# Patient Record
Sex: MALE | Race: WHITE | NOT HISPANIC OR LATINO | Employment: FULL TIME | URBAN - METROPOLITAN AREA
[De-identification: names, ages, dates, MRNs, and addresses within clinical notes are randomized per-mention and may not be internally consistent; named-entity substitution may affect disease eponyms.]

---

## 2024-02-02 ENCOUNTER — HOSPITAL ENCOUNTER (INPATIENT)
Facility: HOSPITAL | Age: 69
LOS: 6 days | Discharge: NURSING, REHAB OR LONG-TERM CARE | DRG: 025 | End: 2024-02-09
Attending: EMERGENCY MEDICINE | Admitting: SURGERY
Payer: MEDICARE

## 2024-02-02 DIAGNOSIS — T14.90XA TRAUMA: ICD-10-CM

## 2024-02-02 DIAGNOSIS — R79.89 ELEVATED TROPONIN: ICD-10-CM

## 2024-02-02 DIAGNOSIS — S06.5XAA SUBDURAL HEMATOMA: ICD-10-CM

## 2024-02-02 DIAGNOSIS — G44.83 PRIMARY COUGH HEADACHE: ICD-10-CM

## 2024-02-02 DIAGNOSIS — S06.5X9A TRAUMATIC SUBDURAL HEMATOMA WITH LOSS OF CONSCIOUSNESS, INITIAL ENCOUNTER: Primary | ICD-10-CM

## 2024-02-02 PROCEDURE — 99291 CRITICAL CARE FIRST HOUR: CPT

## 2024-02-02 PROCEDURE — 94002 VENT MGMT INPAT INIT DAY: CPT

## 2024-02-02 PROCEDURE — 96365 THER/PROPH/DIAG IV INF INIT: CPT

## 2024-02-02 PROCEDURE — 99900035 HC TECH TIME PER 15 MIN (STAT)

## 2024-02-02 PROCEDURE — 27200966 HC CLOSED SUCTION SYSTEM

## 2024-02-02 PROCEDURE — 5A1935Z RESPIRATORY VENTILATION, LESS THAN 24 CONSECUTIVE HOURS: ICD-10-PCS | Performed by: SURGERY

## 2024-02-02 RX ORDER — FENTANYL CITRATE-0.9 % NACL/PF 10 MCG/ML
0-250 PLASTIC BAG, INJECTION (ML) INTRAVENOUS CONTINUOUS
Status: DISCONTINUED | OUTPATIENT
Start: 2024-02-03 | End: 2024-02-06

## 2024-02-02 RX ADMIN — PROPOFOL 40 MCG/KG/MIN: 10 INJECTION, EMULSION INTRAVENOUS at 11:02

## 2024-02-03 ENCOUNTER — ANESTHESIA EVENT (OUTPATIENT)
Dept: SURGERY | Facility: HOSPITAL | Age: 69
DRG: 025 | End: 2024-02-03
Payer: MEDICARE

## 2024-02-03 ENCOUNTER — ANESTHESIA (OUTPATIENT)
Dept: SURGERY | Facility: HOSPITAL | Age: 69
DRG: 025 | End: 2024-02-03
Payer: MEDICARE

## 2024-02-03 PROBLEM — S06.5XAA SUBDURAL HEMATOMA: Status: ACTIVE | Noted: 2024-02-03

## 2024-02-03 LAB
ABORH RETYPE: NORMAL
ALBUMIN SERPL-MCNC: 3.5 G/DL (ref 3.4–4.8)
ALBUMIN SERPL-MCNC: 3.7 G/DL (ref 3.4–4.8)
ALBUMIN/GLOB SERPL: 1.5 RATIO (ref 1.1–2)
ALBUMIN/GLOB SERPL: 1.6 RATIO (ref 1.1–2)
ALP SERPL-CCNC: 49 UNIT/L (ref 40–150)
ALP SERPL-CCNC: 51 UNIT/L (ref 40–150)
ALT SERPL-CCNC: 35 UNIT/L (ref 0–55)
ALT SERPL-CCNC: 39 UNIT/L (ref 0–55)
APPEARANCE UR: CLEAR
APTT PPP: 25.7 SECONDS (ref 23.2–33.7)
AST SERPL-CCNC: 29 UNIT/L (ref 5–34)
AST SERPL-CCNC: 35 UNIT/L (ref 5–34)
BACTERIA #/AREA URNS AUTO: ABNORMAL /HPF
BASE EXCESS BLD CALC-SCNC: -0.3 MMOL/L (ref -2–2)
BASOPHILS # BLD AUTO: 0.05 X10(3)/MCL
BASOPHILS # BLD AUTO: 0.07 X10(3)/MCL
BASOPHILS NFR BLD AUTO: 0.4 %
BASOPHILS NFR BLD AUTO: 0.4 %
BILIRUB SERPL-MCNC: 1.4 MG/DL
BILIRUB SERPL-MCNC: 1.6 MG/DL
BILIRUB UR QL STRIP.AUTO: NEGATIVE
BLOOD GAS SAMPLE TYPE (OHS): ABNORMAL
BUN SERPL-MCNC: 14.1 MG/DL (ref 8.4–25.7)
BUN SERPL-MCNC: 15.9 MG/DL (ref 8.4–25.7)
CA-I BLD-SCNC: 1.05 MMOL/L (ref 1.12–1.23)
CALCIUM SERPL-MCNC: 8.1 MG/DL (ref 8.8–10)
CALCIUM SERPL-MCNC: 8.6 MG/DL (ref 8.8–10)
CHLORIDE SERPL-SCNC: 107 MMOL/L (ref 98–107)
CHLORIDE SERPL-SCNC: 109 MMOL/L (ref 98–107)
CO2 BLDA-SCNC: 25.3 MMOL/L
CO2 SERPL-SCNC: 22 MMOL/L (ref 23–31)
CO2 SERPL-SCNC: 23 MMOL/L (ref 23–31)
COHGB MFR BLDA: 1.9 % (ref 0.5–1.5)
COLOR UR AUTO: ABNORMAL
CREAT SERPL-MCNC: 0.74 MG/DL (ref 0.73–1.18)
CREAT SERPL-MCNC: 0.84 MG/DL (ref 0.73–1.18)
DRAWN BY BLOOD GAS (OHS): ABNORMAL
EOSINOPHIL # BLD AUTO: 0.04 X10(3)/MCL (ref 0–0.9)
EOSINOPHIL # BLD AUTO: 0.05 X10(3)/MCL (ref 0–0.9)
EOSINOPHIL NFR BLD AUTO: 0.3 %
EOSINOPHIL NFR BLD AUTO: 0.4 %
ERYTHROCYTE [DISTWIDTH] IN BLOOD BY AUTOMATED COUNT: 14.2 % (ref 11.5–17)
ERYTHROCYTE [DISTWIDTH] IN BLOOD BY AUTOMATED COUNT: 14.5 % (ref 11.5–17)
GFR SERPLBLD CREATININE-BSD FMLA CKD-EPI: >60 MLS/MIN/1.73/M2
GFR SERPLBLD CREATININE-BSD FMLA CKD-EPI: >60 MLS/MIN/1.73/M2
GLOBULIN SER-MCNC: 2.2 GM/DL (ref 2.4–3.5)
GLOBULIN SER-MCNC: 2.4 GM/DL (ref 2.4–3.5)
GLUCOSE SERPL-MCNC: 112 MG/DL (ref 82–115)
GLUCOSE SERPL-MCNC: 140 MG/DL (ref 82–115)
GLUCOSE UR QL STRIP.AUTO: NORMAL
GROUP & RH: NORMAL
HCO3 BLDA-SCNC: 24.1 MMOL/L (ref 22–26)
HCT VFR BLD AUTO: 40.2 % (ref 42–52)
HCT VFR BLD AUTO: 44.3 % (ref 42–52)
HGB BLD-MCNC: 13.9 G/DL (ref 14–18)
HGB BLD-MCNC: 14.8 G/DL (ref 14–18)
IMM GRANULOCYTES # BLD AUTO: 0.06 X10(3)/MCL (ref 0–0.04)
IMM GRANULOCYTES # BLD AUTO: 0.08 X10(3)/MCL (ref 0–0.04)
IMM GRANULOCYTES NFR BLD AUTO: 0.4 %
IMM GRANULOCYTES NFR BLD AUTO: 0.7 %
INDIRECT COOMBS: NORMAL
INHALED O2 CONCENTRATION: 30 %
INR PPP: 1
INR PPP: 1.1
KETONES UR QL STRIP.AUTO: NEGATIVE
LACTATE SERPL-SCNC: 2 MMOL/L (ref 0.5–2.2)
LEUKOCYTE ESTERASE UR QL STRIP.AUTO: 250
LYMPHOCYTES # BLD AUTO: 0.49 X10(3)/MCL (ref 0.6–4.6)
LYMPHOCYTES # BLD AUTO: 1.2 X10(3)/MCL (ref 0.6–4.6)
LYMPHOCYTES NFR BLD AUTO: 4.4 %
LYMPHOCYTES NFR BLD AUTO: 7.5 %
MCH RBC QN AUTO: 31.5 PG (ref 27–31)
MCH RBC QN AUTO: 31.7 PG (ref 27–31)
MCHC RBC AUTO-ENTMCNC: 33.4 G/DL (ref 33–36)
MCHC RBC AUTO-ENTMCNC: 34.6 G/DL (ref 33–36)
MCV RBC AUTO: 91.8 FL (ref 80–94)
MCV RBC AUTO: 94.3 FL (ref 80–94)
MECH RR (OHS): 24 B/MIN
METHGB MFR BLDA: 0.9 % (ref 0.4–1.5)
MODE (OHS): AC
MONOCYTES # BLD AUTO: 0.64 X10(3)/MCL (ref 0.1–1.3)
MONOCYTES # BLD AUTO: 1.51 X10(3)/MCL (ref 0.1–1.3)
MONOCYTES NFR BLD AUTO: 5.7 %
MONOCYTES NFR BLD AUTO: 9.4 %
NEUTROPHILS # BLD AUTO: 13.1 X10(3)/MCL (ref 2.1–9.2)
NEUTROPHILS # BLD AUTO: 9.86 X10(3)/MCL (ref 2.1–9.2)
NEUTROPHILS NFR BLD AUTO: 82 %
NEUTROPHILS NFR BLD AUTO: 88.4 %
NITRITE UR QL STRIP.AUTO: NEGATIVE
NRBC BLD AUTO-RTO: 0 %
NRBC BLD AUTO-RTO: 0 %
O2 HB BLOOD GAS (OHS): 94.4 % (ref 94–97)
OXYGEN DEVICE BLOOD GAS (OHS): ABNORMAL
OXYHGB MFR BLDA: 13.6 G/DL (ref 12–16)
PCO2 BLDA: 38 MMHG (ref 35–45)
PEEP RESPIRATORY: 8 CMH2O
PH BLDA: 7.41 [PH] (ref 7.35–7.45)
PH UR STRIP.AUTO: 6.5 [PH]
PLATELET # BLD AUTO: 124 X10(3)/MCL (ref 130–400)
PLATELET # BLD AUTO: 169 X10(3)/MCL (ref 130–400)
PLATELETS.RETICULATED NFR BLD AUTO: 5.5 % (ref 0.9–11.2)
PMV BLD AUTO: 11.4 FL (ref 7.4–10.4)
PMV BLD AUTO: 11.8 FL (ref 7.4–10.4)
PO2 BLDA: 75 MMHG (ref 80–100)
POTASSIUM BLOOD GAS (OHS): 3.7 MMOL/L (ref 3.5–5)
POTASSIUM SERPL-SCNC: 4.2 MMOL/L (ref 3.5–5.1)
POTASSIUM SERPL-SCNC: 4.5 MMOL/L (ref 3.5–5.1)
PROT SERPL-MCNC: 5.7 GM/DL (ref 5.8–7.6)
PROT SERPL-MCNC: 6.1 GM/DL (ref 5.8–7.6)
PROT UR QL STRIP.AUTO: NEGATIVE
PROTHROMBIN TIME: 13.4 SECONDS (ref 12.5–14.5)
PROTHROMBIN TIME: 14.4 SECONDS (ref 12.5–14.5)
RBC # BLD AUTO: 4.38 X10(6)/MCL (ref 4.7–6.1)
RBC # BLD AUTO: 4.7 X10(6)/MCL (ref 4.7–6.1)
RBC #/AREA URNS AUTO: ABNORMAL /HPF
RBC UR QL AUTO: ABNORMAL
SAMPLE SITE BLOOD GAS (OHS): ABNORMAL
SAO2 % BLDA: 95 %
SODIUM BLOOD GAS (OHS): 132 MMOL/L (ref 137–145)
SODIUM SERPL-SCNC: 139 MMOL/L (ref 136–145)
SODIUM SERPL-SCNC: 140 MMOL/L (ref 136–145)
SP GR UR STRIP.AUTO: 1.02 (ref 1–1.03)
SPECIMEN OUTDATE: NORMAL
SPONT+MECH VT ON VENT: 500 ML
SQUAMOUS #/AREA URNS LPF: ABNORMAL /HPF
TROPONIN I SERPL-MCNC: 0.34 NG/ML (ref 0–0.04)
TROPONIN I SERPL-MCNC: 0.55 NG/ML (ref 0–0.04)
UROBILINOGEN UR STRIP-ACNC: NORMAL
WBC # SPEC AUTO: 11.16 X10(3)/MCL (ref 4.5–11.5)
WBC # SPEC AUTO: 15.99 X10(3)/MCL (ref 4.5–11.5)
WBC #/AREA URNS AUTO: ABNORMAL /HPF

## 2024-02-03 PROCEDURE — 85025 COMPLETE CBC W/AUTO DIFF WBC: CPT | Performed by: STUDENT IN AN ORGANIZED HEALTH CARE EDUCATION/TRAINING PROGRAM

## 2024-02-03 PROCEDURE — 25000003 PHARM REV CODE 250

## 2024-02-03 PROCEDURE — 25000003 PHARM REV CODE 250: Performed by: EMERGENCY MEDICINE

## 2024-02-03 PROCEDURE — 99223 1ST HOSP IP/OBS HIGH 75: CPT | Mod: 57,ICN,, | Performed by: STUDENT IN AN ORGANIZED HEALTH CARE EDUCATION/TRAINING PROGRAM

## 2024-02-03 PROCEDURE — 96366 THER/PROPH/DIAG IV INF ADDON: CPT

## 2024-02-03 PROCEDURE — 93010 ELECTROCARDIOGRAM REPORT: CPT | Mod: ,,, | Performed by: INTERNAL MEDICINE

## 2024-02-03 PROCEDURE — 25000003 PHARM REV CODE 250: Performed by: SURGERY

## 2024-02-03 PROCEDURE — 80053 COMPREHEN METABOLIC PANEL: CPT

## 2024-02-03 PROCEDURE — 93005 ELECTROCARDIOGRAM TRACING: CPT

## 2024-02-03 PROCEDURE — 99900035 HC TECH TIME PER 15 MIN (STAT)

## 2024-02-03 PROCEDURE — 63600175 PHARM REV CODE 636 W HCPCS: Performed by: EMERGENCY MEDICINE

## 2024-02-03 PROCEDURE — D9220A PRA ANESTHESIA: Mod: CRNA,,,

## 2024-02-03 PROCEDURE — 37000008 HC ANESTHESIA 1ST 15 MINUTES: Performed by: STUDENT IN AN ORGANIZED HEALTH CARE EDUCATION/TRAINING PROGRAM

## 2024-02-03 PROCEDURE — 63600175 PHARM REV CODE 636 W HCPCS: Performed by: STUDENT IN AN ORGANIZED HEALTH CARE EDUCATION/TRAINING PROGRAM

## 2024-02-03 PROCEDURE — 27201423 OPTIME MED/SURG SUP & DEVICES STERILE SUPPLY: Performed by: STUDENT IN AN ORGANIZED HEALTH CARE EDUCATION/TRAINING PROGRAM

## 2024-02-03 PROCEDURE — 85730 THROMBOPLASTIN TIME PARTIAL: CPT | Performed by: STUDENT IN AN ORGANIZED HEALTH CARE EDUCATION/TRAINING PROGRAM

## 2024-02-03 PROCEDURE — C1762 CONN TISS, HUMAN(INC FASCIA): HCPCS | Performed by: STUDENT IN AN ORGANIZED HEALTH CARE EDUCATION/TRAINING PROGRAM

## 2024-02-03 PROCEDURE — 00C40ZZ EXTIRPATION OF MATTER FROM INTRACRANIAL SUBDURAL SPACE, OPEN APPROACH: ICD-10-PCS | Performed by: STUDENT IN AN ORGANIZED HEALTH CARE EDUCATION/TRAINING PROGRAM

## 2024-02-03 PROCEDURE — 83605 ASSAY OF LACTIC ACID: CPT

## 2024-02-03 PROCEDURE — 63600175 PHARM REV CODE 636 W HCPCS: Performed by: SURGERY

## 2024-02-03 PROCEDURE — 20800000 HC ICU TRAUMA

## 2024-02-03 PROCEDURE — 81001 URINALYSIS AUTO W/SCOPE: CPT | Performed by: SURGERY

## 2024-02-03 PROCEDURE — C1713 ANCHOR/SCREW BN/BN,TIS/BN: HCPCS | Performed by: STUDENT IN AN ORGANIZED HEALTH CARE EDUCATION/TRAINING PROGRAM

## 2024-02-03 PROCEDURE — 85025 COMPLETE CBC W/AUTO DIFF WBC: CPT

## 2024-02-03 PROCEDURE — 86901 BLOOD TYPING SEROLOGIC RH(D): CPT | Performed by: STUDENT IN AN ORGANIZED HEALTH CARE EDUCATION/TRAINING PROGRAM

## 2024-02-03 PROCEDURE — 63600175 PHARM REV CODE 636 W HCPCS

## 2024-02-03 PROCEDURE — D9220A PRA ANESTHESIA: Mod: ANES,,, | Performed by: ANESTHESIOLOGY

## 2024-02-03 PROCEDURE — 25000003 PHARM REV CODE 250: Performed by: STUDENT IN AN ORGANIZED HEALTH CARE EDUCATION/TRAINING PROGRAM

## 2024-02-03 PROCEDURE — 36620 INSERTION CATHETER ARTERY: CPT | Mod: 59,,, | Performed by: ANESTHESIOLOGY

## 2024-02-03 PROCEDURE — 99900031 HC PATIENT EDUCATION (STAT)

## 2024-02-03 PROCEDURE — 85610 PROTHROMBIN TIME: CPT | Performed by: STUDENT IN AN ORGANIZED HEALTH CARE EDUCATION/TRAINING PROGRAM

## 2024-02-03 PROCEDURE — 84484 ASSAY OF TROPONIN QUANT: CPT | Performed by: SURGERY

## 2024-02-03 PROCEDURE — 36000711: Performed by: STUDENT IN AN ORGANIZED HEALTH CARE EDUCATION/TRAINING PROGRAM

## 2024-02-03 PROCEDURE — 85610 PROTHROMBIN TIME: CPT

## 2024-02-03 PROCEDURE — 36000710: Performed by: STUDENT IN AN ORGANIZED HEALTH CARE EDUCATION/TRAINING PROGRAM

## 2024-02-03 PROCEDURE — 96365 THER/PROPH/DIAG IV INF INIT: CPT | Mod: 59

## 2024-02-03 PROCEDURE — 84484 ASSAY OF TROPONIN QUANT: CPT | Performed by: EMERGENCY MEDICINE

## 2024-02-03 PROCEDURE — 99900026 HC AIRWAY MAINTENANCE (STAT)

## 2024-02-03 PROCEDURE — 87086 URINE CULTURE/COLONY COUNT: CPT | Performed by: SURGERY

## 2024-02-03 PROCEDURE — 27000221 HC OXYGEN, UP TO 24 HOURS

## 2024-02-03 PROCEDURE — 61312 CRNEC/CRNOT STTL XDRL/SDRL: CPT | Mod: ,,, | Performed by: STUDENT IN AN ORGANIZED HEALTH CARE EDUCATION/TRAINING PROGRAM

## 2024-02-03 PROCEDURE — 37000009 HC ANESTHESIA EA ADD 15 MINS: Performed by: STUDENT IN AN ORGANIZED HEALTH CARE EDUCATION/TRAINING PROGRAM

## 2024-02-03 DEVICE — SCREW SD 1.5X4MM TI CROSS PIN: Type: IMPLANTABLE DEVICE | Site: SCALP | Status: FUNCTIONAL

## 2024-02-03 DEVICE — COVER UN3 BURRHOLE 14MM TAB: Type: IMPLANTABLE DEVICE | Site: SCALP | Status: FUNCTIONAL

## 2024-02-03 DEVICE — DURAMATRIX ONLAY PLUS 5X7: Type: IMPLANTABLE DEVICE | Site: SCALP | Status: FUNCTIONAL

## 2024-02-03 RX ORDER — CEFAZOLIN SODIUM 1 G/3ML
INJECTION, POWDER, FOR SOLUTION INTRAMUSCULAR; INTRAVENOUS
Status: DISCONTINUED | OUTPATIENT
Start: 2024-02-03 | End: 2024-02-03 | Stop reason: HOSPADM

## 2024-02-03 RX ORDER — METHOCARBAMOL 100 MG/ML
500 INJECTION, SOLUTION INTRAMUSCULAR; INTRAVENOUS 3 TIMES DAILY
Status: DISPENSED | OUTPATIENT
Start: 2024-02-03 | End: 2024-02-05

## 2024-02-03 RX ORDER — ACETAMINOPHEN 10 MG/ML
INJECTION, SOLUTION INTRAVENOUS
Status: DISCONTINUED | OUTPATIENT
Start: 2024-02-03 | End: 2024-02-03

## 2024-02-03 RX ORDER — OXYCODONE HYDROCHLORIDE 5 MG/1
5 TABLET ORAL EVERY 4 HOURS PRN
Status: DISCONTINUED | OUTPATIENT
Start: 2024-02-03 | End: 2024-02-09 | Stop reason: HOSPADM

## 2024-02-03 RX ORDER — PROPOFOL 10 MG/ML
INJECTION, EMULSION INTRAVENOUS
Status: COMPLETED
Start: 2024-02-03 | End: 2024-02-03

## 2024-02-03 RX ORDER — MUPIROCIN 20 MG/G
OINTMENT TOPICAL 2 TIMES DAILY
Status: DISPENSED | OUTPATIENT
Start: 2024-02-03 | End: 2024-02-08

## 2024-02-03 RX ORDER — FAMOTIDINE 20 MG/1
20 TABLET, FILM COATED ORAL 2 TIMES DAILY
Status: DISCONTINUED | OUTPATIENT
Start: 2024-02-03 | End: 2024-02-03

## 2024-02-03 RX ORDER — ONDANSETRON HYDROCHLORIDE 2 MG/ML
INJECTION, SOLUTION INTRAVENOUS
Status: DISCONTINUED | OUTPATIENT
Start: 2024-02-03 | End: 2024-02-03

## 2024-02-03 RX ORDER — SPIRONOLACTONE 25 MG/1
25 TABLET ORAL DAILY
COMMUNITY

## 2024-02-03 RX ORDER — PROPOFOL 10 MG/ML
VIAL (ML) INTRAVENOUS
Status: DISCONTINUED | OUTPATIENT
Start: 2024-02-03 | End: 2024-02-03

## 2024-02-03 RX ORDER — ATORVASTATIN CALCIUM 10 MG/1
10 TABLET, FILM COATED ORAL DAILY
COMMUNITY

## 2024-02-03 RX ORDER — POLYETHYLENE GLYCOL 3350 17 G/17G
17 POWDER, FOR SOLUTION ORAL 2 TIMES DAILY
Status: DISCONTINUED | OUTPATIENT
Start: 2024-02-03 | End: 2024-02-09 | Stop reason: HOSPADM

## 2024-02-03 RX ORDER — ACETAMINOPHEN 325 MG/1
650 TABLET ORAL EVERY 4 HOURS
Status: DISCONTINUED | OUTPATIENT
Start: 2024-02-03 | End: 2024-02-03

## 2024-02-03 RX ORDER — LEVETIRACETAM 500 MG/1
500 TABLET ORAL 2 TIMES DAILY
Status: DISCONTINUED | OUTPATIENT
Start: 2024-02-03 | End: 2024-02-03

## 2024-02-03 RX ORDER — PROPOFOL 10 MG/ML
0-50 INJECTION, EMULSION INTRAVENOUS CONTINUOUS
Status: DISCONTINUED | OUTPATIENT
Start: 2024-02-03 | End: 2024-02-06

## 2024-02-03 RX ORDER — LIDOCAINE HYDROCHLORIDE AND EPINEPHRINE 5; 5 MG/ML; UG/ML
INJECTION, SOLUTION INFILTRATION; PERINEURAL
Status: DISCONTINUED | OUTPATIENT
Start: 2024-02-03 | End: 2024-02-03 | Stop reason: HOSPADM

## 2024-02-03 RX ORDER — ALLOPURINOL 300 MG/1
300 TABLET ORAL DAILY
COMMUNITY

## 2024-02-03 RX ORDER — CEFAZOLIN SODIUM 2 G/50ML
2 SOLUTION INTRAVENOUS
Status: COMPLETED | OUTPATIENT
Start: 2024-02-03 | End: 2024-02-04

## 2024-02-03 RX ORDER — LABETALOL HYDROCHLORIDE 5 MG/ML
10 INJECTION, SOLUTION INTRAVENOUS EVERY 4 HOURS PRN
Status: DISCONTINUED | OUTPATIENT
Start: 2024-02-03 | End: 2024-02-09 | Stop reason: HOSPADM

## 2024-02-03 RX ORDER — ACETAMINOPHEN 10 MG/ML
1000 INJECTION, SOLUTION INTRAVENOUS ONCE
Status: ACTIVE | OUTPATIENT
Start: 2024-02-03 | End: 2024-02-04

## 2024-02-03 RX ORDER — ROCURONIUM BROMIDE 10 MG/ML
INJECTION, SOLUTION INTRAVENOUS
Status: DISCONTINUED | OUTPATIENT
Start: 2024-02-03 | End: 2024-02-03

## 2024-02-03 RX ORDER — CARVEDILOL 3.12 MG/1
3.12 TABLET ORAL 2 TIMES DAILY WITH MEALS
COMMUNITY

## 2024-02-03 RX ORDER — METHOCARBAMOL 500 MG/1
500 TABLET, FILM COATED ORAL EVERY 8 HOURS
Status: DISCONTINUED | OUTPATIENT
Start: 2024-02-03 | End: 2024-02-03

## 2024-02-03 RX ORDER — MORPHINE SULFATE 4 MG/ML
2 INJECTION, SOLUTION INTRAMUSCULAR; INTRAVENOUS
Status: DISCONTINUED | OUTPATIENT
Start: 2024-02-03 | End: 2024-02-04

## 2024-02-03 RX ORDER — CEFAZOLIN SODIUM 1 G/3ML
INJECTION, POWDER, FOR SOLUTION INTRAMUSCULAR; INTRAVENOUS
Status: DISCONTINUED | OUTPATIENT
Start: 2024-02-03 | End: 2024-02-03

## 2024-02-03 RX ORDER — SODIUM CHLORIDE 9 MG/ML
INJECTION, SOLUTION INTRAVENOUS CONTINUOUS
Status: DISCONTINUED | OUTPATIENT
Start: 2024-02-03 | End: 2024-02-06

## 2024-02-03 RX ORDER — DOCUSATE SODIUM 100 MG/1
100 CAPSULE, LIQUID FILLED ORAL 2 TIMES DAILY
Status: DISCONTINUED | OUTPATIENT
Start: 2024-02-03 | End: 2024-02-09 | Stop reason: HOSPADM

## 2024-02-03 RX ORDER — FAMOTIDINE 10 MG/ML
20 INJECTION INTRAVENOUS 2 TIMES DAILY
Status: DISCONTINUED | OUTPATIENT
Start: 2024-02-03 | End: 2024-02-08

## 2024-02-03 RX ORDER — PROPOFOL 10 MG/ML
0-50 INJECTION, EMULSION INTRAVENOUS CONTINUOUS
Status: DISCONTINUED | OUTPATIENT
Start: 2024-02-03 | End: 2024-02-03

## 2024-02-03 RX ORDER — NAPROXEN SODIUM 220 MG/1
81 TABLET, FILM COATED ORAL DAILY
COMMUNITY

## 2024-02-03 RX ORDER — RAMIPRIL 2.5 MG/1
2.5 CAPSULE ORAL DAILY
COMMUNITY

## 2024-02-03 RX ORDER — DEXAMETHASONE SODIUM PHOSPHATE 4 MG/ML
INJECTION, SOLUTION INTRA-ARTICULAR; INTRALESIONAL; INTRAMUSCULAR; INTRAVENOUS; SOFT TISSUE
Status: DISCONTINUED | OUTPATIENT
Start: 2024-02-03 | End: 2024-02-03

## 2024-02-03 RX ORDER — BISACODYL 10 MG/1
10 SUPPOSITORY RECTAL DAILY PRN
Status: DISCONTINUED | OUTPATIENT
Start: 2024-02-03 | End: 2024-02-09 | Stop reason: HOSPADM

## 2024-02-03 RX ORDER — FUROSEMIDE 40 MG/1
40 TABLET ORAL 2 TIMES DAILY
COMMUNITY

## 2024-02-03 RX ORDER — HYDRALAZINE HYDROCHLORIDE 20 MG/ML
10 INJECTION INTRAMUSCULAR; INTRAVENOUS EVERY 4 HOURS PRN
Status: DISCONTINUED | OUTPATIENT
Start: 2024-02-03 | End: 2024-02-09 | Stop reason: HOSPADM

## 2024-02-03 RX ORDER — TALC
6 POWDER (GRAM) TOPICAL NIGHTLY PRN
Status: DISCONTINUED | OUTPATIENT
Start: 2024-02-03 | End: 2024-02-09 | Stop reason: HOSPADM

## 2024-02-03 RX ADMIN — ROCURONIUM BROMIDE 20 MG: 10 SOLUTION INTRAVENOUS at 03:02

## 2024-02-03 RX ADMIN — POLYETHYLENE GLYCOL 3350 17 G: 17 POWDER, FOR SOLUTION ORAL at 08:02

## 2024-02-03 RX ADMIN — LEVETIRACETAM 500 MG: 100 INJECTION, SOLUTION INTRAVENOUS at 12:02

## 2024-02-03 RX ADMIN — CEFAZOLIN SODIUM 2 G: 2 SOLUTION INTRAVENOUS at 08:02

## 2024-02-03 RX ADMIN — PROPOFOL 35 MCG/KG/MIN: 10 INJECTION, EMULSION INTRAVENOUS at 08:02

## 2024-02-03 RX ADMIN — MUPIROCIN: 20 OINTMENT TOPICAL at 08:02

## 2024-02-03 RX ADMIN — ACETAMINOPHEN 1000 MG: 10 INJECTION, SOLUTION INTRAVENOUS at 03:02

## 2024-02-03 RX ADMIN — HYDRALAZINE HYDROCHLORIDE 10 MG: 20 INJECTION INTRAMUSCULAR; INTRAVENOUS at 08:02

## 2024-02-03 RX ADMIN — METHOCARBAMOL 500 MG: 100 INJECTION INTRAMUSCULAR; INTRAVENOUS at 08:02

## 2024-02-03 RX ADMIN — PROPOFOL 50 MG: 10 INJECTION, EMULSION INTRAVENOUS at 02:02

## 2024-02-03 RX ADMIN — ROCURONIUM BROMIDE 50 MG: 10 SOLUTION INTRAVENOUS at 02:02

## 2024-02-03 RX ADMIN — PROPOFOL 50 MG: 10 INJECTION, EMULSION INTRAVENOUS at 03:02

## 2024-02-03 RX ADMIN — PROPOFOL 40 MCG/KG/MIN: 10 INJECTION, EMULSION INTRAVENOUS at 01:02

## 2024-02-03 RX ADMIN — CEFAZOLIN SODIUM 2 G: 2 SOLUTION INTRAVENOUS at 03:02

## 2024-02-03 RX ADMIN — SODIUM CHLORIDE: 9 INJECTION, SOLUTION INTRAVENOUS at 02:02

## 2024-02-03 RX ADMIN — DEXAMETHASONE SODIUM PHOSPHATE 4 MG: 4 INJECTION, SOLUTION INTRA-ARTICULAR; INTRALESIONAL; INTRAMUSCULAR; INTRAVENOUS; SOFT TISSUE at 03:02

## 2024-02-03 RX ADMIN — OXYCODONE HYDROCHLORIDE 5 MG: 5 TABLET ORAL at 06:02

## 2024-02-03 RX ADMIN — DOCUSATE SODIUM 100 MG: 100 CAPSULE, LIQUID FILLED ORAL at 08:02

## 2024-02-03 RX ADMIN — FAMOTIDINE 20 MG: 10 INJECTION, SOLUTION INTRAVENOUS at 08:02

## 2024-02-03 RX ADMIN — SODIUM CHLORIDE, SODIUM GLUCONATE, SODIUM ACETATE, POTASSIUM CHLORIDE AND MAGNESIUM CHLORIDE: 526; 502; 368; 37; 30 INJECTION, SOLUTION INTRAVENOUS at 02:02

## 2024-02-03 RX ADMIN — LEVETIRACETAM 500 MG: 100 INJECTION, SOLUTION INTRAVENOUS at 08:02

## 2024-02-03 RX ADMIN — HYDRALAZINE HYDROCHLORIDE 10 MG: 20 INJECTION INTRAMUSCULAR; INTRAVENOUS at 12:02

## 2024-02-03 RX ADMIN — ONDANSETRON 4 MG: 2 INJECTION INTRAMUSCULAR; INTRAVENOUS at 04:02

## 2024-02-03 RX ADMIN — METHOCARBAMOL 500 MG: 100 INJECTION INTRAMUSCULAR; INTRAVENOUS at 03:02

## 2024-02-03 RX ADMIN — CEFAZOLIN 2 G: 330 INJECTION, POWDER, FOR SOLUTION INTRAMUSCULAR; INTRAVENOUS at 03:02

## 2024-02-03 RX ADMIN — ROCURONIUM BROMIDE 20 MG: 10 SOLUTION INTRAVENOUS at 04:02

## 2024-02-03 NOTE — ANESTHESIA PREPROCEDURE EVALUATION
02/03/2024  Rico Briggs is a 68 y.o., male.      Pre-op Assessment    I have reviewed the Patient Summary Reports.     I have reviewed the Nursing Notes.    I have reviewed the Medications.     Review of Systems  Cardiovascular:    Pacemaker                                         Endocrine:        Obesity / BMI > 30      Physical Exam  General: Well nourished and Unconscious    Airway:  Mallampati: unable to assess   Pre-Existing Airway: Oral Endotracheal tube    Chest/Lungs:  Clear to auscultation    Heart:  Rate: Normal    Anesthesia Plan  Type of Anesthesia, risks & benefits discussed:    Anesthesia Type: Gen ETT  Intra-op Monitoring Plan: Standard ASA Monitors and Art Line  Post Op Pain Control Plan: multimodal analgesia  Induction:  Inhalation  ASA Score: 4 Emergent  Day of Surgery Review of History & Physical: H&P Update referred to the surgeon/provider.  Anesthesia Plan Notes: 69 y/o male s/p syncopal episode at [facility] in [place] with large R subdural bleed with midline shift. Was intubated at [facility] for rapid mental status changes, and transported to [facility] for neurosurgical care.    Ready For Surgery From Anesthesia Perspective.   .

## 2024-02-03 NOTE — ANESTHESIA PROCEDURE NOTES
Arterial    Diagnosis: Sub dural bleed    Patient location during procedure: done in OR  Timeout: 2/3/2024 3:15 AM  Procedure end time: 2/3/2024 3:15 AM    Staffing  Authorizing Provider: Hai Armando MD  Performing Provider: Hai Armando MD    Staffing  Performed by: Hai Armando MD  Authorized by: Hai Armando MD    Anesthesiologist was present at the time of the procedure.    Preanesthetic Checklist  Completed: patient identified, IV checked, site marked, risks and benefits discussed, surgical consent, monitors and equipment checked, pre-op evaluation, timeout performed and anesthesia consent givenArterial  Skin Prep: chlorhexidine gluconate  Orientation: right  Location: radial    Catheter Size: 20 G  Catheter placement by Anatomical landmarks. Heme positive aspiration all ports. Insertion Attempts: 1  Assessment  Dressing: secured with tape and tegaderm  Patient: Tolerated well

## 2024-02-03 NOTE — CONSULTS
[facility] Neurosurgery  Hospital Consultation    Reason for Consultation: SDH    Neurosurgical Emergent Response     This patient met criteria for Neurosurgical Emergent Response due to Severe TBI (GCS <9) with head CT evidence of intracranial trauma.  The Neurosurgery team was notified at 00:11. Patient evaluation began at 00:18.     SUBJECTIVE:     Chief Complaint   Fall/syncope with head strike    History of Present Illness:   Patient is a 68-year-old male with unknown past medical history who presents as a transfer from [place].  Per report, he had a syncopal [place] his [place].  On arrival to outside facility he was awake and oriented.  He had decline in mental status and required intubation for airway protection.  CT head demonstrated subdural hematoma.  Transferred [facility] for neurosurgical evaluation.    Patient Active Problem List    Diagnosis Date Noted    Subdural hematoma 02/03/2024     No past medical history on file.  No past surgical history on file.  (Not in a hospital admission)    Review of patient's allergies indicates:  No Known Allergies  Social History     Tobacco Use    Smoking status: Not on file    Smokeless tobacco: Not on file   Substance Use Topics    Alcohol use: Not on file     No family history on file.    Vital Signs  Temp: 98.1 °F (36.7 °C)  Temp Source: Oral  Pulse: 78  Resp: (!) 23  SpO2: 100 %  Oxygen Concentration (%): 50  Device (Oxygen Therapy): ventilator  BP: (!) 113/59  Patient Position: Lying  Height and Weight  Height: 6' (182.9 cm)  Height Method: Estimated  Weight: 103 kg (227 lb 1.2 oz)  BSA (Calculated - sq m): 2.29 sq meters  BMI (Calculated): 30.8  Weight in (lb) to have BMI = 25: 183.9]    ROS: unable to obtain    OBJECTIVE:     Vitals:    02/02/24 2345 02/03/24 0002 02/03/24 0037 02/03/24 0049   BP:  126/73 (!) 113/59    Patient Position:       Pulse: 74 74 78 78   Resp: 16 (!) 21 (!) 22 (!) 23   Temp:       TempSrc:        SpO2: 100% 99% 100% 100%   Weight:    103 kg (227 lb 1.2 oz)   Height:    6' (1.829 m)      GCS7T  Pupils 3mm reactive  Localizes briskly LUE    Data Review:    Radiology review:    Repeat CT head  There is profound increase in the acute subdural hemorrhage surrounding the right cerebral hemisphere now measuring 1.4 cm in thickness (coronal series 5, image 39). There is associated pronounced interval increase in leftward subfalcine herniation now with a midline shift of 1.4 cm (series 3, image 34). There there is near-complete effacement of the right cerebral hemisphere sulci and there is near-complete effacement of the right lateral ventricle with enlargement of the left lateral ventricle consistent with obstructive hydrocephalus of the left lateral ventricle. There is also interval increase in size of a right intraparenchymal hemorrhagic component centered on image 34 series 3 measuring 2 cm in diameter on the current study     ASSESSMENT/PLAN:     68-year-old male with large right-sided acute subdural hematoma causing mass effect and midline shift.  Significantly worse compared to prior imaging.  Emergent surgical evacuation is indicated.    OR for right-sided craniotomy for hematoma evacuation.    ICU postop    Lauri Bangura MD  Neurosurgery

## 2024-02-03 NOTE — ANESTHESIA POSTPROCEDURE EVALUATION
Anesthesia Post Evaluation    Patient: Rico Briggs    Procedure(s) Performed: Procedure(s) (LRB):  CRANIOTOMY, FOR SUBDURAL HEMATOMA EVACUATION (Right)    Final Anesthesia Type: general      Patient location during evaluation: ICU  Patient participation: No - Unable to Participate, Sedation  Level of consciousness: sedated  Post-procedure vital signs: reviewed and stable  Pain management: adequate  Airway patency: patent  RUPESH mitigation strategies: Multimodal analgesia  PONV status at discharge: No PONV  Anesthetic complications: no      Cardiovascular status: blood pressure returned to baseline and hemodynamically stable  Respiratory status: ETT and ventilator  Hydration status: euvolemic  Follow-up not needed.            Vitals Value Taken Time   BP  02/03/24 0540   Temp  02/03/24 0540   Pulse 85 02/03/24 0539   Resp 24 02/03/24 0539   SpO2 95 % 02/03/24 0539   Vitals shown include unvalidated device data.      No case tracking events are documented in the log.      Pain/Antonio Score: No data recorded

## 2024-02-03 NOTE — ED PROVIDER NOTES
Encounter Date: 2/2/2024       History     Chief Complaint   Patient presents with    Head Injury     Transfer from [facility] for SDH after a fall     Patient is a 68-year-old male that was transferred from outside facility secondary to subdural hematoma.  Patient apparently had a fall sometime earlier tonight, fell and hit the back of his head.  Patient sustained a laceration to the posterior scalp.  CT was done and patient has a right-sided subdural hematoma.  Apparently in the transferring ER, patient arrived alert but had a change in mental status and started vomiting so patient was intubated prior to transfer at the transferring facility.  Patient is reportedly not on blood thinners.  Patient has a history of pacemaker.  Patient did have slightly elevated troponin at the transferring facility.      Review of patient's allergies indicates:  No Known Allergies  No past medical history on file.  No past surgical history on file.  No family history on file.     Review of Systems   Unable to perform ROS: Intubated       Physical Exam     Initial Vitals [02/02/24 2338]   BP Pulse Resp Temp SpO2   121/70 79 20 98.1 °F (36.7 °C) 99 %      MAP       --         Physical Exam    Nursing note and vitals reviewed.  Constitutional: He appears well-developed and well-nourished.   Patient is currently on sedation, propofol   HENT:   Patient has a laceration to the mid occipital area which has been stapled at the transferring facility   Eyes:   Pupils are 3 mm bilaterally and reactive   Cardiovascular:  Normal rate, regular rhythm and normal heart sounds.           Patient has a pacemaker to the left upper chest   Pulmonary/Chest:   Patient is intubated, has good bilateral air movement.   Abdominal: Abdomen is soft. There is no abdominal tenderness.     Neurological:   Patient is on sedation, propofol.  When propofol was turned down, patient was reaching for the ET tube so propofol was increased.  Still on sedation,  patient withdraws to painful stimulus bilateral lower extremities.         ED Course   Critical Care    Date/Time: 2/3/2024 12:46 AM    Performed by: Hai Roper MD  Authorized by: Hai Roper MD  Direct patient critical care time: 15 minutes  Additional history critical care time: 5 minutes  Ordering / reviewing critical care time: 5 minutes  Documentation critical care time: 10 minutes  Consulting other physicians critical care time: 10 minutes  Total critical care time (exclusive of procedural time) : 45 minutes  Critical care time was exclusive of separately billable procedures and treating other patients and teaching time.  Critical care was necessary to treat or prevent imminent or life-threatening deterioration of the following conditions: CNS failure or compromise.  Critical care was time spent personally by me on the following activities: discussions with consultants, discussions with primary provider, interpretation of cardiac output measurements, evaluation of patient's response to treatment, examination of patient, ordering and performing treatments and interventions, ordering and review of laboratory studies, ordering and review of radiographic studies, pulse oximetry, re-evaluation of patient's condition and review of old charts.        Labs Reviewed   TROPONIN I - Abnormal; Notable for the following components:       Result Value    Troponin-I 0.552 (*)     All other components within normal limits   CBC W/ AUTO DIFFERENTIAL    Narrative:     The following orders were created for panel order CBC Auto Differential.  Procedure                               Abnormality         Status                     ---------                               -----------         ------                     CBC with Differential[[id number]]                                                        Please view results for these tests on the individual orders.   BASIC METABOLIC PANEL   PROTIME-INR   APTT   CBC  WITH DIFFERENTIAL   TYPE & SCREEN     EKG Readings: (Independently Interpreted)   Rhythm: Paced Rhythm. Heart Rate: 78.       Imaging Results              CT Head Without Contrast (Preliminary result)  Result time 02/03/24 01:15:52      Preliminary result by Brett Jacob MD (02/03/24 01:15:52)                   Narrative:    START OF REPORT:  Technique: CT of the head was performed without intravenous contrast with axial as well as coronal and sagittal images.    Comparison: Comparison is with study dated 2024-02-02 18:33:26.    Dosage Information: Automated Exposure Control was utilized 1344.65 mGy.cm.    Clinical history: Subdural hemorrhage.    Findings:  Hemorrhage: There is profound increase in the acute subdural hemorrhage surrounding the right cerebral hemisphere now measuring 1.4 cm in thickness (coronal series 5, image 39). There is associated pronounced interval increase in leftward subfalcine herniation now with a midline shift of 1.4 cm (series 3, image 34). There there is near-complete effacement of the right cerebral hemisphere sulci and there is near-complete effacement of the right lateral ventricle with enlargement of the left lateral ventricle consistent with obstructive hydrocephalus of the left lateral ventricle. There is also interval increase in size of a right intraparenchymal hemorrhagic component centered on image 34 series 3 measuring 2 cm in diameter on the current study.  Brain parenchyma: There is preservation of the grey white junction throughout. No acute infarct.  Cerebellum: Unremarkable.  Vascular: Unremarkable venous sinuses. Mild stable appearing atheromatous calcification of the intracranial arteries is seen.  Sella and skull base: The sella appears to be within normal limits for age.  Cerebellopontine angles: Within normal limits.  Intracranial calcifications: Incidental note is made of bilateral choroid plexus calcification. Incidental note is made of some pineal region  calcification.  Calvarium: No acute linear or depressed skull fracture is seen.    Maxillofacial Structures:  Paranasal sinuses: There is stable appearing opacity and air fluid levels bilateral maxillary sinuses and ethmoid air cells.  Orbits: The orbits appear unremarkable.  Zygomatic arches: The zygomatic arches are intact and unremarkable.  Temporal bones and mastoids: The temporal bones and mastoids appear unremarkable.  TMJ: The mandibular condyles appear normally placed with respect to the mandibular fossa.  Nasal Bones: The nasal septum is midline. No displaced nasal bone fracture is seen.    Visualized upper cervical spine: The visualized cervical spine appears unremarkable.    Miscellaneous: An nasogastric tube is seen in place.    Notifications: The results were discussed with the emergency room physician (Dr Roper) prior to dictation at 2024-02-03 01:12:04 CST.      Impression:  1. There is profound increase in the acute subdural hemorrhage surrounding the right cerebral hemisphere now measuring 1.4 cm in thickness (coronal series 5, image 39). There is associated pronounced interval increase in leftward subfalcine herniation now with a midline shift of 1.4 cm (series 3, image 34). There there is near-complete effacement of the right cerebral hemisphere sulci and there is near-complete effacement of the right lateral ventricle with enlargement of the left lateral ventricle consistent with obstructive hydrocephalus of the left lateral ventricle. There is also interval increase in size of a right intraparenchymal hemorrhagic component centered on image 34 series 3 measuring 2 cm in diameter on the current study. Correlate with clinical and laboratory findings as regards additional evaluation and follow-up to full resolution as indicated.  2. Details and other findings as noted above.                                         Medications   fentaNYL 2500 mcg in 0.9% sodium chloride 250 mL infusion premix  (titrating) (0 mcg/hr Intravenous Hold 2/3/24 0100)   levETIRAcetam (Keppra) 500 mg in dextrose 5 % in water (D5W) 100 mL IVPB (MB+) (0 mg Intravenous Stopped 2/3/24 0110)   propofol (DIPRIVAN) 10 mg/mL infusion (40 mcg/kg/min × 103 kg Intravenous New Bag 2/3/24 0141)     Medical Decision Making  Differential diagnosis: Fall, scalp laceration, subdural hematoma    Amount and/or Complexity of Data Reviewed  Labs: ordered.     Details: Repeat troponin 0.552  Radiology: ordered and independent interpretation performed.  ECG/medicine tests: ordered. Decision-making details documented in ED Course.  Discussion of management or test interpretation with external provider(s): Patient diagnosed with right-sided subdural hematoma at the transferring facility.  Patient remains on sedation.  Sedation was turned down and patient started grasping at the ET tube.  Patient withdraws from painful stimulus.  Neurosurgery was consulted.  Trauma service will admit.  Neurosurgery recommends Q2 hr neuro checks, ICU, Keppra 500 mg IV b.i.d., repeat head CT in the a.m..  Repeat CT of the brain shows worsening subdural hematoma, Dr. Bangura will take to surgery.    Risk  Prescription drug management.               ED Course as of 02/03/24 0204   Sat Feb 03, 2024   0011 Neurosurgery was consulted [KG]   0011 Trauma service was consulted [KG]   0018 Dr. Bangura, on-call for Neurosurgery recommends admit to Trauma Service, ICU, Keppra 500 mg IV b.i.d., keep systolic blood pressure less than 160 and q.2 hours neuro checks [KG]   0045 Trauma service is aware of patient. [KG]   0116 Dr Bangura called again secondary to worsening CT of the head.  He states he will take patient to the OR. [KG]      ED Course User Index  [KG] Hai Roper MD                           Clinical Impression:  Final diagnoses:  [T14.90XA] Trauma  [S06.5X9A] Traumatic subdural hematoma with loss of consciousness, initial encounter (Primary)  [R79.89] Elevated  troponin          ED Disposition Condition    Admit Stable                Hai Roper MD  02/03/24 9717

## 2024-02-03 NOTE — TRANSFER OF CARE
Anesthesia Transfer of Care Note    Patient: Rico Briggs    Procedure(s) Performed: Procedure(s) (LRB):  CRANIOTOMY, FOR SUBDURAL HEMATOMA EVACUATION (Right)    Patient location: ICU    Anesthesia Type: general    Transport from OR: Transported from OR intubated on 100% O2 by AMBU with adequate controlled ventilation. Continuous SpO2 monitoring in transport. Continuous ECG monitoring in transport. Continuos invasive BP monitoring in transport. Upon arrival to PACU/ICU, patient attached to ventilator and auscultated to confirm bilateral breath sounds and adequate TV    Post pain: adequate analgesia    Post assessment: no apparent anesthetic complications and tolerated procedure well    Post vital signs: stable    Level of consciousness: sedated    Nausea/Vomiting: no nausea/vomiting    Complications: none    Transfer of care protocol was followed      Last vitals: Visit Vitals  /72   Pulse 84   Temp 36.7 °C (98.1 °F) (Oral)   Resp 20   Ht 6' (1.829 m)   Wt 103 kg (227 lb 1.2 oz)   SpO2 96%   BMI 30.80 kg/m²

## 2024-02-03 NOTE — BRIEF OP NOTE
[facility] - Periop Services  Brief Operative Note    SUMMARY     Surgery Date: 2/3/2024     Surgeon(s) and Role:     * Lauri Farrell MD - Primary    Assisting Surgeon: None    Pre-op Diagnosis:  Subdural hematoma [S06.5XAA]    Post-op Diagnosis:  Post-Op Diagnosis Codes:     * Subdural hematoma [S06.5XAA]    Procedure(s) (LRB):  CRANIOTOMY, FOR SUBDURAL HEMATOMA EVACUATION (Right)    Anesthesia: General    Implants:  Implant Name Type Inv. Item Serial No.  Lot No. LRB No. Used Action   DURAMATRIX ONLAY PLUS 5X7 - SNA  DURAMATRIX ONLAY PLUS 5X7 NA [employer] H[employer] SERV [id number] Right 1 Implanted   SCREW SD 1.5X4MM TI CROSS PIN - SNA  SCREW SD 1.5X4MM TI CROSS PIN NA [employer] TRA. [id number] Right 8 Implanted   COVER UN3 BURRHOLE 14MM TAB - SNA  COVER UN3 BURRHOLE 14MM TAB NA [employer] TRA. [id number] Right 4 Implanted     Operative Findings:  Large right acute subdural hematoma was evacuated    Estimated Blood Loss: 250 mL    Estimated Blood Loss has been documented.         Specimens: none    PLAN  Trauma ICU  Q1h Neuro checks, wean sedation as able to obtain exam  CT head 8am  Hemovac to full suction  HOB 30 degrees  SBP < 140  Keppra 500mg BID  Ancef 24h  SCDs    Lauri Bangura MD  Neurosurgery

## 2024-02-03 NOTE — OP NOTE
DATE OF OPERATION:   2/3/24    PREOPERATIVE DIAGNOSIS:   1. Right acute subdural hematoma     POSTOPERATIVE DIAGNOSIS:   1. Right acute subdural hematoma     SURGEON:  Lauri Bangura MD    PROCEDURE:   1. Right craniotomy for evacuation of subdural hematoma     ANESTHESIA:   General endotracheal     BLOOD LOSS:   250 mL    SPECIMEN(s):   None    COMPLICATIONS:   None     DRAINS:   Subgaleal Medium Hemovac to full suction    HISTORY:   Patient is a 68-year-old male with unknown past medical history who presents as a transfer from [place].  Per report, he had a syncopal [place].  On arrival to outside facility he was awake and oriented.  He had decline in mental status and required intubation for airway protection.  CT head demonstrated large right acute subdural hematoma causing mass effect and midline shift. Emergent craniotomy for hematoma evacuation was indicated. Unable to locate family. Proceeded with emergent/two physician consent.     FINDINGS:   Large right acute subdural hematoma evacuated, right frontal contusion, no active bleeding.    PROCEDURE IN DETAIL:   After endotracheal intubation and induction of general anesthesia, a roll was placed under the ipsilateral shoulder and the head turned contralaterally and elevated. The head was placed in the Eagle 3-point pin fixation head rest and the head was shaved. An incision was marked out and, after prepping and draping in the usual fashion, it was infiltrated with local anesthetic containing epinephrine. The incision was carried down through the skin and subcutaneous tissues with a knife. Debbie clips were applied to the scalp edges and then the temporalis muscle was divided with unipolar cautery and a musculocutaneous flap was reflected in the subperiosteal plane.  Self-retaining hooks were used. The air-driven  was used to enter the cranium and then the craniotome was used to elevate an  appropriate-sized flap. Then the dura was opened and subdural clot evacuated with a combination of suction, irrigation and cup forceps. Attempts were made to identify an active bleeding source and obtain control if present. Thorough circumferential inspection and evacuation of the subdural space was carried out. The dura was then closed, generally in a non-watertight fashion, with 4-0 Nurolon suture. An onlay graft of dural substitute was used. Dural margins were tacked up circumferentially over fibrillar Surgicel cigarettes with 4-0 Nurolon and then the bone flap was replaced with the titanium cranial plating system.  The wound was irrigated copiously with antibiotic irrigation. A subgaleal drain was placed. The temporalis fascia was then closed with 3-0 Vicryl in an interrupted fashion. The scalp was closed with 2-0 Vicryl for the galea and staples for the skin edges.  A dressing was applied.  The patient was then taken to the trauma ICU in satisfactory condition with correct sponge and needle counts.     Lauri Bangura MD  Neurosurgery  [facility]

## 2024-02-03 NOTE — H&P
TICU   History and Physical Note    Patient Name: Rico Briggs  YOB: 1955  Date: 02/03/2024 2:54 AM  Date of Admission: 2/2/2024  HD#0  POD#Day of Surgery    PRESENTING HISTORY   Chief Complaint/Reason for Admission: Subdural hematoma    History of Present Illness:  68-year-old male who was transferred from an outside hospital after an unwitnessed fall at some point yesterday resulting in a subdural hematoma, posterior head laceration repair with staples at outside hospital.  Upon arrival her transfer patient was alert, but over the course monitoring the patient he had a worsening change in neuro status, neurosurgery was consulted again after repeat CT head showed worsening of subdural hematoma.  Past medical history of this patient has unknown other than a pacemaker in place.    Review of Systems:  12 point ROS negative except as stated in HPI    PAST HISTORY:   Past medical history:  No past medical history on file.    Past surgical history:  No past surgical history on file.    Family history:  No family history on file.    Social history:     Social History     Tobacco Use   Smoking Status Not on file   Smokeless Tobacco Not on file      Social History     Substance and Sexual Activity   Alcohol Use Not on file        MEDICATIONS & ALLERGIES:   Allergies: Review of patient's allergies indicates:  No Known Allergies  Home Meds:   Current Outpatient Medications   Medication Instructions    allopurinoL (ZYLOPRIM) 300 mg, Oral, Daily    aspirin 81 mg, Oral, Daily    atorvastatin (LIPITOR) 10 mg, Oral, Daily    carvediloL (COREG) 3.125 mg, Oral, 2 times daily with meals    furosemide (LASIX) 40 mg, Oral, 2 times daily    ramipriL (ALTACE) 2.5 mg, Oral, Daily    spironolactone (ALDACTONE) 25 mg, Oral, Daily      No current facility-administered medications on file prior to encounter.     Current Outpatient Medications on File Prior to Encounter   Medication Sig Dispense Refill    allopurinoL  (ZYLOPRIM) 300 MG tablet Take 300 mg by mouth once daily.      aspirin 81 MG Chew Take 81 mg by mouth once daily.      atorvastatin (LIPITOR) 10 MG tablet Take 10 mg by mouth once daily.      carvediloL (COREG) 3.125 MG tablet Take 3.125 mg by mouth 2 (two) times daily with meals.      furosemide (LASIX) 40 MG tablet Take 40 mg by mouth 2 (two) times daily.      ramipriL (ALTACE) 2.5 MG capsule Take 2.5 mg by mouth once daily.      spironolactone (ALDACTONE) 25 MG tablet Take 25 mg by mouth once daily.        No current facility-administered medications on file prior to encounter.     Current Outpatient Medications on File Prior to Encounter   Medication Sig    allopurinoL (ZYLOPRIM) 300 MG tablet Take 300 mg by mouth once daily.    aspirin 81 MG Chew Take 81 mg by mouth once daily.    atorvastatin (LIPITOR) 10 MG tablet Take 10 mg by mouth once daily.    carvediloL (COREG) 3.125 MG tablet Take 3.125 mg by mouth 2 (two) times daily with meals.    furosemide (LASIX) 40 MG tablet Take 40 mg by mouth 2 (two) times daily.    ramipriL (ALTACE) 2.5 MG capsule Take 2.5 mg by mouth once daily.    spironolactone (ALDACTONE) 25 MG tablet Take 25 mg by mouth once daily.     Scheduled Meds:   acetaminophen  1,000 mg Intravenous Once    docusate sodium  100 mg Oral BID    famotidine (PF)  20 mg Intravenous BID    levETIRAcetam (Keppra) IV (PEDS and ADULTS)  500 mg Intravenous Q12H    methocarbamoL  500 mg Intravenous TID    polyethylene glycol  17 g Oral BID     Continuous Infusions:   sodium chloride 0.9% 125 mL/hr at 02/03/24 0240    fentanyl Stopped (02/03/24 0100)    propofoL 30 mcg/kg/min (02/03/24 0218)     PRN Meds:bisacodyL, melatonin, morphine, oxyCODONE    OBJECTIVE:   Vital Signs:  VITAL SIGNS: 24 HR MIN & MAX LAST   Temp  Min: 98.1 °F (36.7 °C)  Max: 98.1 °F (36.7 °C)  98.1 °F (36.7 °C)   BP  Min: 113/59  Max: 126/73  (!) 113/59    Pulse  Min: 74  Max: 79  78    Resp  Min: 16  Max: 23  (!) 23    SpO2  Min: 99 %  Max:  "100 %  100 %      HT: 6' (182.9 cm)  WT: 103 kg (227 lb 1.2 oz)  BMI: 30.8   Intake/output: I/O this shift:  In: 64.4 [I.V.:64.4]  Out: 500 [Urine:500]     Lines/drains/airway:       Peripheral IV - Single Lumen 02/03/24 0227 18 G Hand (Active)   Number of days: 0            Peripheral IV - Double Lumen 02/02/24 2300 Distal;Left;Posterior Forearm (Active)   Number of days: 0            Peripheral IV - Double Lumen 02/02/24 2300 Posterior;Right Wrist (Active)   Number of days: 0            NG/OG Tube 02/02/24 2300 orogastric 18 Fr. Center mouth (Active)   Number of days: 0            Urethral Catheter 02/02/24 2300 16 Fr. (Active)   Number of days: 0       Physical Exam:  General:   intubated and sedated  HEENT:  Posterior head lag with staples, ET tube in place, OG tube in place  CV:  RR, 2+ DPs bilaterally  Resp/chest:  Bilateral chest rise  GI:  Abdomen soft,, non-distended, we will healed surgical scars in place  :  Deferred  MSK:  Unable to assess given sedation at this time  Neuro: GCS 3T, while on sedation  Skin/Wounds:  Posterior head laceration with staples    Labs:  Troponin:  Recent Labs     02/03/24  0033   TROPONINI 0.552*     CBC:  Recent Labs     02/03/24  0215   WBC 15.99*   RBC 4.70   HGB 14.8   HCT 44.3      MCV 94.3*   MCH 31.5*   MCHC 33.4     CMP:  No results for input(s): "GLU", "CALCIUM", "ALBUMIN", "PROT", "NA", "K", "CO2", "CL", "BUN", "CREATININE", "ALKPHOS", "ALT", "AST", "BILITOT" in the last 72 hours.  Lactic Acid:  No results for input(s): "LACTATE" in the last 72 hours.  ETOH:  No results for input(s): "ETHANOL" in the last 72 hours.   Urine Drug Screen:  No results for input(s): "COCAINE", "OPIATE", "BARBITURATE", "AMPHETAMINE", "FENTANYL", "CANNABINOIDS", "MDMA" in the last 72 hours.    Invalid input(s): "BENZODIAZEPINE", "PHENCYCLIDINE"   ABG  No results for input(s): "PH", "PO2", "PCO2", "HCO3", "BE" in the last 168 hours.      Diagnostic Results:  CT Head Without Contrast "         Xray Previous   Final Result      Xray Previous   Final Result      CT Previous   Final Result      CT Previous   Final Result      X-Ray Chest 1 View    (Results Pending)       ASSESSMENT & PLAN:    \68-year-old male who has a presumed fall yesterday transferred from outside hospital with a subdural hematoma, during ED stay patient with worsening neuro status repeat CT head showed profound increase in acute subdural hemorrhage with leftward herniation and midline shift of 1.4 cm.      Neurosurgery reconsulted, they are taking patient to the OR at this time  We will admit to give  We will follow up with further neurosurgery recommendations   Keppra 500 b.i.d.   Sedation as needed,   Mechanical ventilation as needed   Daily chest x-ray   Daily labs   Multimodal pain control    Carol Ann Martinez MD  [employer] General Surgery PGY 2

## 2024-02-04 ENCOUNTER — ANESTHESIA EVENT (OUTPATIENT)
Dept: SURGERY | Facility: HOSPITAL | Age: 69
DRG: 025 | End: 2024-02-04
Payer: MEDICARE

## 2024-02-04 ENCOUNTER — ANESTHESIA (OUTPATIENT)
Dept: SURGERY | Facility: HOSPITAL | Age: 69
DRG: 025 | End: 2024-02-04
Payer: MEDICARE

## 2024-02-04 LAB
ABO + RH BLD: NORMAL
ALBUMIN SERPL-MCNC: 3.4 G/DL (ref 3.4–4.8)
ALBUMIN/GLOB SERPL: 1.3 RATIO (ref 1.1–2)
ALP SERPL-CCNC: 41 UNIT/L (ref 40–150)
ALT SERPL-CCNC: 27 UNIT/L (ref 0–55)
AST SERPL-CCNC: 23 UNIT/L (ref 5–34)
BASE EXCESS BLD CALC-SCNC: 3.5 MMOL/L
BASOPHILS # BLD AUTO: 0.06 X10(3)/MCL
BASOPHILS # BLD AUTO: 0.06 X10(3)/MCL
BASOPHILS NFR BLD AUTO: 0.4 %
BASOPHILS NFR BLD AUTO: 0.4 %
BILIRUB SERPL-MCNC: 1.4 MG/DL
BLD PROD TYP BPU: NORMAL
BLOOD GAS SAMPLE TYPE (OHS): ABNORMAL
BLOOD UNIT EXPIRATION DATE: NORMAL
BLOOD UNIT TYPE CODE: 6200
BUN SERPL-MCNC: 10.1 MG/DL (ref 8.4–25.7)
CA-I BLD-SCNC: 1.08 MMOL/L (ref 1.12–1.23)
CALCIUM SERPL-MCNC: 8.3 MG/DL (ref 8.8–10)
CHLORIDE SERPL-SCNC: 109 MMOL/L (ref 98–107)
CO2 BLDA-SCNC: 27.5 MMOL/L
CO2 SERPL-SCNC: 22 MMOL/L (ref 23–31)
CREAT SERPL-MCNC: 0.65 MG/DL (ref 0.73–1.18)
CROSSMATCH INTERPRETATION: NORMAL
DISPENSE STATUS: NORMAL
DRAWN BY BLOOD GAS (OHS): ABNORMAL
EOSINOPHIL # BLD AUTO: 0.02 X10(3)/MCL (ref 0–0.9)
EOSINOPHIL # BLD AUTO: 0.04 X10(3)/MCL (ref 0–0.9)
EOSINOPHIL NFR BLD AUTO: 0.1 %
EOSINOPHIL NFR BLD AUTO: 0.3 %
ERYTHROCYTE [DISTWIDTH] IN BLOOD BY AUTOMATED COUNT: 14.3 % (ref 11.5–17)
ERYTHROCYTE [DISTWIDTH] IN BLOOD BY AUTOMATED COUNT: 14.3 % (ref 11.5–17)
GFR SERPLBLD CREATININE-BSD FMLA CKD-EPI: >60 MLS/MIN/1.73/M2
GLOBULIN SER-MCNC: 2.6 GM/DL (ref 2.4–3.5)
GLUCOSE SERPL-MCNC: 126 MG/DL (ref 82–115)
HCO3 BLDA-SCNC: 26.5 MMOL/L (ref 22–26)
HCT VFR BLD AUTO: 36.9 % (ref 42–52)
HCT VFR BLD AUTO: 38.1 % (ref 42–52)
HGB BLD-MCNC: 12.2 G/DL (ref 14–18)
HGB BLD-MCNC: 12.7 G/DL (ref 14–18)
IMM GRANULOCYTES # BLD AUTO: 0.06 X10(3)/MCL (ref 0–0.04)
IMM GRANULOCYTES # BLD AUTO: 0.08 X10(3)/MCL (ref 0–0.04)
IMM GRANULOCYTES NFR BLD AUTO: 0.4 %
IMM GRANULOCYTES NFR BLD AUTO: 0.6 %
INR PPP: 1.2
LYMPHOCYTES # BLD AUTO: 0.75 X10(3)/MCL (ref 0.6–4.6)
LYMPHOCYTES # BLD AUTO: 0.88 X10(3)/MCL (ref 0.6–4.6)
LYMPHOCYTES NFR BLD AUTO: 5.4 %
LYMPHOCYTES NFR BLD AUTO: 5.6 %
MCH RBC QN AUTO: 31.4 PG (ref 27–31)
MCH RBC QN AUTO: 31.8 PG (ref 27–31)
MCHC RBC AUTO-ENTMCNC: 33.1 G/DL (ref 33–36)
MCHC RBC AUTO-ENTMCNC: 33.3 G/DL (ref 33–36)
MCV RBC AUTO: 94.1 FL (ref 80–94)
MCV RBC AUTO: 96.1 FL (ref 80–94)
MONOCYTES # BLD AUTO: 1.18 X10(3)/MCL (ref 0.1–1.3)
MONOCYTES # BLD AUTO: 1.84 X10(3)/MCL (ref 0.1–1.3)
MONOCYTES NFR BLD AUTO: 11.2 %
MONOCYTES NFR BLD AUTO: 8.7 %
NEUTROPHILS # BLD AUTO: 11.38 X10(3)/MCL (ref 2.1–9.2)
NEUTROPHILS # BLD AUTO: 13.52 X10(3)/MCL (ref 2.1–9.2)
NEUTROPHILS NFR BLD AUTO: 82.5 %
NEUTROPHILS NFR BLD AUTO: 84.4 %
NRBC BLD AUTO-RTO: 0 %
NRBC BLD AUTO-RTO: 0 %
PCO2 BLDA: 34 MMHG (ref 35–45)
PH BLDA: 7.5 [PH] (ref 7.35–7.45)
PLATELET # BLD AUTO: 147 X10(3)/MCL (ref 130–400)
PLATELET # BLD AUTO: 155 X10(3)/MCL (ref 130–400)
PLATELETS.RETICULATED NFR BLD AUTO: 8.1 % (ref 0.9–11.2)
PMV BLD AUTO: 11.5 FL (ref 7.4–10.4)
PMV BLD AUTO: 12.2 FL (ref 7.4–10.4)
PO2 BLDA: 62 MMHG (ref 80–100)
POTASSIUM BLOOD GAS (OHS): 3.6 MMOL/L (ref 3.5–5)
POTASSIUM SERPL-SCNC: 3.6 MMOL/L (ref 3.5–5.1)
PROT SERPL-MCNC: 6 GM/DL (ref 5.8–7.6)
PROTHROMBIN TIME: 15.2 SECONDS (ref 12.5–14.5)
RBC # BLD AUTO: 3.84 X10(6)/MCL (ref 4.7–6.1)
RBC # BLD AUTO: 4.05 X10(6)/MCL (ref 4.7–6.1)
SAMPLE SITE BLOOD GAS (OHS): ABNORMAL
SAO2 % BLDA: 93 %
SODIUM BLOOD GAS (OHS): 138 MMOL/L (ref 137–145)
SODIUM SERPL-SCNC: 139 MMOL/L (ref 136–145)
UNIT NUMBER: NORMAL
WBC # SPEC AUTO: 13.49 X10(3)/MCL (ref 4.5–11.5)
WBC # SPEC AUTO: 16.38 X10(3)/MCL (ref 4.5–11.5)

## 2024-02-04 PROCEDURE — 25000003 PHARM REV CODE 250: Performed by: STUDENT IN AN ORGANIZED HEALTH CARE EDUCATION/TRAINING PROGRAM

## 2024-02-04 PROCEDURE — 87205 SMEAR GRAM STAIN: CPT | Performed by: STUDENT IN AN ORGANIZED HEALTH CARE EDUCATION/TRAINING PROGRAM

## 2024-02-04 PROCEDURE — 87070 CULTURE OTHR SPECIMN AEROBIC: CPT | Performed by: STUDENT IN AN ORGANIZED HEALTH CARE EDUCATION/TRAINING PROGRAM

## 2024-02-04 PROCEDURE — 36000711: Performed by: STUDENT IN AN ORGANIZED HEALTH CARE EDUCATION/TRAINING PROGRAM

## 2024-02-04 PROCEDURE — 85025 COMPLETE CBC W/AUTO DIFF WBC: CPT | Performed by: STUDENT IN AN ORGANIZED HEALTH CARE EDUCATION/TRAINING PROGRAM

## 2024-02-04 PROCEDURE — 85025 COMPLETE CBC W/AUTO DIFF WBC: CPT

## 2024-02-04 PROCEDURE — 30233R1 TRANSFUSION OF NONAUTOLOGOUS PLATELETS INTO PERIPHERAL VEIN, PERCUTANEOUS APPROACH: ICD-10-PCS | Performed by: SURGERY

## 2024-02-04 PROCEDURE — 37000008 HC ANESTHESIA 1ST 15 MINUTES: Performed by: STUDENT IN AN ORGANIZED HEALTH CARE EDUCATION/TRAINING PROGRAM

## 2024-02-04 PROCEDURE — 99024 POSTOP FOLLOW-UP VISIT: CPT | Mod: ICN,,, | Performed by: STUDENT IN AN ORGANIZED HEALTH CARE EDUCATION/TRAINING PROGRAM

## 2024-02-04 PROCEDURE — D9220A PRA ANESTHESIA: Mod: ANES,,, | Performed by: ANESTHESIOLOGY

## 2024-02-04 PROCEDURE — 61312 CRNEC/CRNOT STTL XDRL/SDRL: CPT | Mod: 78,,, | Performed by: STUDENT IN AN ORGANIZED HEALTH CARE EDUCATION/TRAINING PROGRAM

## 2024-02-04 PROCEDURE — 87116 MYCOBACTERIA CULTURE: CPT | Performed by: STUDENT IN AN ORGANIZED HEALTH CARE EDUCATION/TRAINING PROGRAM

## 2024-02-04 PROCEDURE — 63600175 PHARM REV CODE 636 W HCPCS: Performed by: STUDENT IN AN ORGANIZED HEALTH CARE EDUCATION/TRAINING PROGRAM

## 2024-02-04 PROCEDURE — 20800000 HC ICU TRAUMA

## 2024-02-04 PROCEDURE — P9035 PLATELET PHERES LEUKOREDUCED: HCPCS | Performed by: STUDENT IN AN ORGANIZED HEALTH CARE EDUCATION/TRAINING PROGRAM

## 2024-02-04 PROCEDURE — 87206 SMEAR FLUORESCENT/ACID STAI: CPT | Performed by: STUDENT IN AN ORGANIZED HEALTH CARE EDUCATION/TRAINING PROGRAM

## 2024-02-04 PROCEDURE — 99900035 HC TECH TIME PER 15 MIN (STAT)

## 2024-02-04 PROCEDURE — 27201423 OPTIME MED/SURG SUP & DEVICES STERILE SUPPLY: Performed by: STUDENT IN AN ORGANIZED HEALTH CARE EDUCATION/TRAINING PROGRAM

## 2024-02-04 PROCEDURE — 63600175 PHARM REV CODE 636 W HCPCS: Mod: JZ,JG

## 2024-02-04 PROCEDURE — 37799 UNLISTED PX VASCULAR SURGERY: CPT

## 2024-02-04 PROCEDURE — 97166 OT EVAL MOD COMPLEX 45 MIN: CPT

## 2024-02-04 PROCEDURE — 87075 CULTR BACTERIA EXCEPT BLOOD: CPT | Performed by: STUDENT IN AN ORGANIZED HEALTH CARE EDUCATION/TRAINING PROGRAM

## 2024-02-04 PROCEDURE — 85610 PROTHROMBIN TIME: CPT | Performed by: STUDENT IN AN ORGANIZED HEALTH CARE EDUCATION/TRAINING PROGRAM

## 2024-02-04 PROCEDURE — 63600175 PHARM REV CODE 636 W HCPCS

## 2024-02-04 PROCEDURE — D9220A PRA ANESTHESIA: Mod: CRNA,,,

## 2024-02-04 PROCEDURE — 37000009 HC ANESTHESIA EA ADD 15 MINS: Performed by: STUDENT IN AN ORGANIZED HEALTH CARE EDUCATION/TRAINING PROGRAM

## 2024-02-04 PROCEDURE — 25000003 PHARM REV CODE 250

## 2024-02-04 PROCEDURE — 00C30ZZ EXTIRPATION OF MATTER FROM INTRACRANIAL EPIDURAL SPACE, OPEN APPROACH: ICD-10-PCS | Performed by: STUDENT IN AN ORGANIZED HEALTH CARE EDUCATION/TRAINING PROGRAM

## 2024-02-04 PROCEDURE — 36600 WITHDRAWAL OF ARTERIAL BLOOD: CPT

## 2024-02-04 PROCEDURE — 80053 COMPREHEN METABOLIC PANEL: CPT

## 2024-02-04 PROCEDURE — 87102 FUNGUS ISOLATION CULTURE: CPT | Performed by: STUDENT IN AN ORGANIZED HEALTH CARE EDUCATION/TRAINING PROGRAM

## 2024-02-04 PROCEDURE — 25000003 PHARM REV CODE 250: Performed by: SURGERY

## 2024-02-04 PROCEDURE — C1762 CONN TISS, HUMAN(INC FASCIA): HCPCS | Performed by: STUDENT IN AN ORGANIZED HEALTH CARE EDUCATION/TRAINING PROGRAM

## 2024-02-04 PROCEDURE — 36000710: Performed by: STUDENT IN AN ORGANIZED HEALTH CARE EDUCATION/TRAINING PROGRAM

## 2024-02-04 PROCEDURE — P9037 PLATE PHERES LEUKOREDU IRRAD: HCPCS | Performed by: STUDENT IN AN ORGANIZED HEALTH CARE EDUCATION/TRAINING PROGRAM

## 2024-02-04 PROCEDURE — 82803 BLOOD GASES ANY COMBINATION: CPT

## 2024-02-04 RX ORDER — HYDROCODONE BITARTRATE AND ACETAMINOPHEN 500; 5 MG/1; MG/1
TABLET ORAL
Status: DISCONTINUED | OUTPATIENT
Start: 2024-02-04 | End: 2024-02-09 | Stop reason: HOSPADM

## 2024-02-04 RX ORDER — PHENYLEPHRINE HYDROCHLORIDE 10 MG/ML
INJECTION INTRAVENOUS
Status: DISCONTINUED | OUTPATIENT
Start: 2024-02-04 | End: 2024-02-05

## 2024-02-04 RX ORDER — ROCURONIUM BROMIDE 10 MG/ML
INJECTION, SOLUTION INTRAVENOUS
Status: DISCONTINUED | OUTPATIENT
Start: 2024-02-04 | End: 2024-02-05

## 2024-02-04 RX ORDER — ONDANSETRON HYDROCHLORIDE 2 MG/ML
INJECTION, SOLUTION INTRAVENOUS
Status: DISCONTINUED | OUTPATIENT
Start: 2024-02-04 | End: 2024-02-05

## 2024-02-04 RX ORDER — FENTANYL CITRATE 50 UG/ML
INJECTION, SOLUTION INTRAMUSCULAR; INTRAVENOUS
Status: DISCONTINUED | OUTPATIENT
Start: 2024-02-04 | End: 2024-02-05

## 2024-02-04 RX ORDER — SUCCINYLCHOLINE CHLORIDE 20 MG/ML
INJECTION INTRAMUSCULAR; INTRAVENOUS
Status: DISCONTINUED | OUTPATIENT
Start: 2024-02-04 | End: 2024-02-05

## 2024-02-04 RX ORDER — CEFAZOLIN SODIUM 1 G/3ML
INJECTION, POWDER, FOR SOLUTION INTRAMUSCULAR; INTRAVENOUS
Status: DISCONTINUED | OUTPATIENT
Start: 2024-02-04 | End: 2024-02-05

## 2024-02-04 RX ORDER — LIDOCAINE HYDROCHLORIDE 20 MG/ML
INJECTION, SOLUTION EPIDURAL; INFILTRATION; INTRACAUDAL; PERINEURAL
Status: DISCONTINUED | OUTPATIENT
Start: 2024-02-04 | End: 2024-02-05

## 2024-02-04 RX ORDER — DEXAMETHASONE SODIUM PHOSPHATE 4 MG/ML
INJECTION, SOLUTION INTRA-ARTICULAR; INTRALESIONAL; INTRAMUSCULAR; INTRAVENOUS; SOFT TISSUE
Status: DISCONTINUED | OUTPATIENT
Start: 2024-02-04 | End: 2024-02-05

## 2024-02-04 RX ORDER — MIDAZOLAM HYDROCHLORIDE 1 MG/ML
INJECTION INTRAMUSCULAR; INTRAVENOUS
Status: DISCONTINUED | OUTPATIENT
Start: 2024-02-04 | End: 2024-02-05

## 2024-02-04 RX ORDER — LIDOCAINE HYDROCHLORIDE AND EPINEPHRINE 5; 5 MG/ML; UG/ML
INJECTION, SOLUTION INFILTRATION; PERINEURAL
Status: DISCONTINUED | OUTPATIENT
Start: 2024-02-04 | End: 2024-02-05 | Stop reason: HOSPADM

## 2024-02-04 RX ORDER — SODIUM CHLORIDE 9 MG/ML
INJECTION, SOLUTION INTRAVENOUS CONTINUOUS
Status: DISCONTINUED | OUTPATIENT
Start: 2024-02-04 | End: 2024-02-06

## 2024-02-04 RX ORDER — LIDOCAINE HYDROCHLORIDE 20 MG/ML
INJECTION, SOLUTION EPIDURAL; INFILTRATION; INTRACAUDAL; PERINEURAL
Status: DISCONTINUED | OUTPATIENT
Start: 2024-02-04 | End: 2024-02-04

## 2024-02-04 RX ORDER — PROPOFOL 10 MG/ML
VIAL (ML) INTRAVENOUS
Status: DISCONTINUED | OUTPATIENT
Start: 2024-02-04 | End: 2024-02-05

## 2024-02-04 RX ORDER — ACETAMINOPHEN 10 MG/ML
INJECTION, SOLUTION INTRAVENOUS
Status: DISCONTINUED | OUTPATIENT
Start: 2024-02-04 | End: 2024-02-05

## 2024-02-04 RX ORDER — ACETAMINOPHEN 650 MG/20.3ML
650 LIQUID ORAL EVERY 4 HOURS PRN
Status: DISCONTINUED | OUTPATIENT
Start: 2024-02-04 | End: 2024-02-09 | Stop reason: HOSPADM

## 2024-02-04 RX ADMIN — ONDANSETRON 4 MG: 2 INJECTION INTRAMUSCULAR; INTRAVENOUS at 09:02

## 2024-02-04 RX ADMIN — PROPOFOL 100 MG: 10 INJECTION, EMULSION INTRAVENOUS at 10:02

## 2024-02-04 RX ADMIN — SODIUM CHLORIDE, SODIUM GLUCONATE, SODIUM ACETATE, POTASSIUM CHLORIDE AND MAGNESIUM CHLORIDE: 526; 502; 368; 37; 30 INJECTION, SOLUTION INTRAVENOUS at 11:02

## 2024-02-04 RX ADMIN — FAMOTIDINE 20 MG: 10 INJECTION, SOLUTION INTRAVENOUS at 08:02

## 2024-02-04 RX ADMIN — CEFAZOLIN SODIUM 2 G: 2 SOLUTION INTRAVENOUS at 01:02

## 2024-02-04 RX ADMIN — POLYETHYLENE GLYCOL 3350 17 G: 17 POWDER, FOR SOLUTION ORAL at 08:02

## 2024-02-04 RX ADMIN — MUPIROCIN: 20 OINTMENT TOPICAL at 08:02

## 2024-02-04 RX ADMIN — LIDOCAINE HYDROCHLORIDE 80 MG: 20 INJECTION, SOLUTION EPIDURAL; INFILTRATION; INTRACAUDAL; PERINEURAL at 09:02

## 2024-02-04 RX ADMIN — LEVETIRACETAM 500 MG: 100 INJECTION, SOLUTION INTRAVENOUS at 08:02

## 2024-02-04 RX ADMIN — OXYCODONE HYDROCHLORIDE 5 MG: 5 TABLET ORAL at 03:02

## 2024-02-04 RX ADMIN — MORPHINE SULFATE 2 MG: 4 INJECTION, SOLUTION INTRAMUSCULAR; INTRAVENOUS at 01:02

## 2024-02-04 RX ADMIN — FENTANYL CITRATE 100 MCG: 50 INJECTION, SOLUTION INTRAMUSCULAR; INTRAVENOUS at 10:02

## 2024-02-04 RX ADMIN — PHENYLEPHRINE HYDROCHLORIDE 50 MCG: 10 INJECTION INTRAVENOUS at 10:02

## 2024-02-04 RX ADMIN — ACETAMINOPHEN 1000 MG: 10 INJECTION, SOLUTION INTRAVENOUS at 10:02

## 2024-02-04 RX ADMIN — MIDAZOLAM HYDROCHLORIDE 5 MG: 1 INJECTION, SOLUTION INTRAMUSCULAR; INTRAVENOUS at 09:02

## 2024-02-04 RX ADMIN — ROCURONIUM BROMIDE 20 MG: 10 SOLUTION INTRAVENOUS at 09:02

## 2024-02-04 RX ADMIN — ROCURONIUM BROMIDE 80 MG: 10 SOLUTION INTRAVENOUS at 10:02

## 2024-02-04 RX ADMIN — SUCCINYLCHOLINE CHLORIDE 200 MG: 20 INJECTION, SOLUTION INTRAMUSCULAR; INTRAVENOUS at 09:02

## 2024-02-04 RX ADMIN — DEXAMETHASONE SODIUM PHOSPHATE 8 MG: 4 INJECTION, SOLUTION INTRA-ARTICULAR; INTRALESIONAL; INTRAMUSCULAR; INTRAVENOUS; SOFT TISSUE at 09:02

## 2024-02-04 RX ADMIN — SODIUM CHLORIDE: 9 INJECTION, SOLUTION INTRAVENOUS at 07:02

## 2024-02-04 RX ADMIN — MORPHINE SULFATE 2 MG: 4 INJECTION, SOLUTION INTRAMUSCULAR; INTRAVENOUS at 10:02

## 2024-02-04 RX ADMIN — CEFAZOLIN 2 G: 330 INJECTION, POWDER, FOR SOLUTION INTRAMUSCULAR; INTRAVENOUS at 10:02

## 2024-02-04 RX ADMIN — METHOCARBAMOL 500 MG: 100 INJECTION INTRAMUSCULAR; INTRAVENOUS at 08:02

## 2024-02-04 RX ADMIN — PROPOFOL 200 MG: 10 INJECTION, EMULSION INTRAVENOUS at 09:02

## 2024-02-04 RX ADMIN — ROCURONIUM BROMIDE 20 MG: 10 SOLUTION INTRAVENOUS at 11:02

## 2024-02-04 RX ADMIN — ROCURONIUM BROMIDE 30 MG: 10 SOLUTION INTRAVENOUS at 10:02

## 2024-02-04 RX ADMIN — DOCUSATE SODIUM 100 MG: 100 CAPSULE, LIQUID FILLED ORAL at 08:02

## 2024-02-04 RX ADMIN — PHENYLEPHRINE HYDROCHLORIDE 20 MCG/MIN: 10 INJECTION INTRAVENOUS at 11:02

## 2024-02-04 RX ADMIN — SODIUM CHLORIDE, SODIUM GLUCONATE, SODIUM ACETATE, POTASSIUM CHLORIDE AND MAGNESIUM CHLORIDE: 526; 502; 368; 37; 30 INJECTION, SOLUTION INTRAVENOUS at 09:02

## 2024-02-04 NOTE — PLAN OF CARE
Problem: Occupational Therapy  Goal: Occupational Therapy Goal  Description: LTG: Pt will perform basic ADLs and ADL transfers with Modified independence using LRAD by discharge.    STG: to be met by 3/1    Pt will complete feeding with set up assist.  Pt will complete grooming standing at sink with LRAD with SBA.  Pt will complete UB dressing with SBA.  Pt will complete LB dressing with SBA using LRAD.  Pt will complete toileting with SBA using LRAD.  Pt will complete functional mobility to/from toilet and toilet transfer with SBA using LRAD.   Outcome: Ongoing, Progressing

## 2024-02-04 NOTE — PROGRESS NOTES
NEUROSURGERY PROGRESS NOTE    POD1 R Craniotomy for evacuation of acute SDH    Interval  Extubated yesterday  Neurologic exam stable overnight GCS14-15  BP within parameters  Drain output 140 (50)    AFVSS  OE to voice, oriented x 3  R periorbital swelling, L pupil 3mm reactive  FC x 4 antigravity  Dressing dry  Drain with serosanguinous output    PLAN  Q1h Neuro checks, ok for Q2h at night  CT head tomorrow AM  Hemovac to full suction  HOB 30 degrees  SBP < 140  Keppra 500mg BID x 7 days  SCDs  OK to mobilize     Lauri Bangura MD  Neurosurgery

## 2024-02-04 NOTE — PT/OT/SLP EVAL
"Occupational Therapy  Evaluation    Name: Rico Briggs  MRN: [id number]  Admitting Diagnosis: SDH  Recent Surgery: Procedure(s) (LRB):  CRANIOTOMY, FOR SUBDURAL HEMATOMA EVACUATION (Right) 1 Day Post-Op    Recommendations:     Discharge therapy intensity: High Intensity Therapy   Discharge Equipment Recommendations:  to be determined by next level of care  Barriers to discharge:  None    Assessment:     Rico Briggs is a 68 y.o. male with a medical diagnosis of fall resulting in SDH s/p crani for evac.  He presents with the following performance deficits affecting function: weakness, impaired endurance, impaired self care skills, impaired functional mobility, impaired balance, decreased safety awareness. Eval limited by lethargy, but pt will likely require high intensity therapy on d/c unless significant progress is made during this hospital stay.    Rehab Prognosis: Good; patient would benefit from acute skilled OT services to address these deficits and reach maximum level of function.       Plan:     Patient to be seen 5 x/week to address the above listed problems via self-care/home management, therapeutic activities, therapeutic exercises, neuromuscular re-education, cognitive retraining  Plan of Care Expires: 03/01/24  Plan of Care Reviewed with: patient    Subjective     Chief Complaint: none  Patient/Family Comments/goals: unable to state    Occupational Profile:  Pt with some difficulty providing hx d/t lethargy, but states that he lives with spouse in a single story home with 2 steps to enter/B rail and walk in shower. 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 He reports spouse can assist him on d/c.    Pain/Comfort:  Pain Rating 1: 0/10    Patients cultural, spiritual, Jehovah's witness conflicts given the current situation: no    Objective:     OT communicated with nurse, MD prior to session.      Patient was found HOB elevated with telemetry, pulse ox (continuous), " blood pressure cuff, SCD, hemovac upon OT entry to room.    General Precautions: Standard, fall (BP<140)  Orthopedic Precautions: N/A  Braces: N/A    Vital Signs: Blood Pressure: 110/64  HR: 79  Sp02: 96%  Supplemental 02: 2L NC    Bed Mobility:    Patient completed Supine to Sit with moderate assistance    Functional Mobility/Transfers:  Patient completed Sit <> Stand Transfer with minimum assistance  with  hand-held assist   Patient completed Bed <> Chair Transfer using Step Transfer technique with minimum assistance with hand-held assist  Functional Mobility: min A to chair    Activities of Daily Living:  Feeding:  maximal assistance to drink  Upper Body Dressing: maximal assistance    Lower Body Dressing: total assistance    Toileting: maximal assistance +void in urinal in standing    AMPA 6 Click ADL:  [facility] Total Score: 12    Functional Cognition:  Affect: Lethargic  Orientation: oriented to Person, Place, and Situation  Initiation: Impaired. fair  Command Following: Follows simple one-step commands with 70% accuracy  Safety Awareness: Impaired. poor  Insight into Deficits: Impaired. fair    Visual Perceptual Skills:  R eye swollen shut, L eye with brief eye opening intermittency but unable to sustain for exam. Will monitor as able    Upper Extremity Function:  BUE with grossly intact ROM and strength, however pt with max difficulty using arms functionally for ADL tasks. Appears to be limited primarily by AMS.    Balance:   Static sitting balance: Impaired. Fair+  Static standing balance:Impaired. fair    Therapeutic Positioning  Risk for acquired pressure injuries is increased due to impaired mobility.    Skin assessment: all bony prominences were assessed    Findings:  R eye swelling/bruising    OT recommendations for therapeutic positioning throughout hospitalization:   Follow [facility] Pressure Injury Prevention Protocol    Patient Education:  Patient provided with verbal education education regarding OT  role/goals/POC.  Additional teaching is warranted.     Patient left up in chair with all lines intact, call button in reach, and nurse present    GOALS:   Multidisciplinary Problems       Occupational Therapy Goals          Problem: Occupational Therapy    Goal Priority Disciplines Outcome Interventions   Occupational Therapy Goal     OT, PT/OT Ongoing, Progressing    Description: LTG: Pt will perform basic ADLs and ADL transfers with Modified independence using LRAD by discharge.    STG: to be met by 3/1    Pt will complete feeding with set up assist.  Pt will complete grooming standing at sink with LRAD with SBA.  Pt will complete UB dressing with SBA.  Pt will complete LB dressing with SBA using LRAD.  Pt will complete toileting with SBA using LRAD.  Pt will complete functional mobility to/from toilet and toilet transfer with SBA using LRAD.                        History:     No past medical history on file.    No past surgical history on file.    Time Tracking:     OT Date of Treatment: 02/04/24  OT Start Time: 1300  OT Stop Time: 1320  OT Total Time (min): 20 min    Billable Minutes:Evaluation moderate    2/4/2024

## 2024-02-05 LAB
ALBUMIN SERPL-MCNC: 3.2 G/DL (ref 3.4–4.8)
ALBUMIN/GLOB SERPL: 1.1 RATIO (ref 1.1–2)
ALLENS TEST BLOOD GAS (OHS): ABNORMAL
ALLENS TEST BLOOD GAS (OHS): NORMAL
ALP SERPL-CCNC: 47 UNIT/L (ref 40–150)
ALT SERPL-CCNC: 20 UNIT/L (ref 0–55)
AST SERPL-CCNC: 17 UNIT/L (ref 5–34)
BACTERIA UR CULT: ABNORMAL
BASE EXCESS BLD CALC-SCNC: 3.1 MMOL/L (ref -2–2)
BASE EXCESS BLD CALC-SCNC: NORMAL MMOL/L
BASOPHILS # BLD AUTO: 0.04 X10(3)/MCL
BASOPHILS NFR BLD AUTO: 0.3 %
BILIRUB SERPL-MCNC: 1.5 MG/DL
BLOOD GAS SAMPLE TYPE (OHS): ABNORMAL
BLOOD GAS SAMPLE TYPE (OHS): NORMAL
BUN SERPL-MCNC: 11.7 MG/DL (ref 8.4–25.7)
CA-I BLD-SCNC: 1.09 MMOL/L (ref 1.12–1.23)
CA-I BLD-SCNC: NORMAL MMOL/L
CALCIUM SERPL-MCNC: 8.2 MG/DL (ref 8.8–10)
CHLORIDE SERPL-SCNC: 105 MMOL/L (ref 98–107)
CLOSURE TME COLL+ADP BLD: 205 SECONDS (ref 46–119)
CLOSURE TME COLL+EPINEP BLD: 255 SECONDS (ref 68–183)
CO2 BLDA-SCNC: 28.2 MMOL/L
CO2 BLDA-SCNC: NORMAL MMOL/L
CO2 SERPL-SCNC: 23 MMOL/L (ref 23–31)
COHGB MFR BLDA: 2 % (ref 0.5–1.5)
CREAT SERPL-MCNC: 0.69 MG/DL (ref 0.73–1.18)
CRP SERPL-MCNC: 132.2 MG/L
DRAWN BY BLOOD GAS (OHS): ABNORMAL
DRAWN BY BLOOD GAS (OHS): NORMAL
EOSINOPHIL # BLD AUTO: 0.02 X10(3)/MCL (ref 0–0.9)
EOSINOPHIL NFR BLD AUTO: 0.2 %
ERYTHROCYTE [DISTWIDTH] IN BLOOD BY AUTOMATED COUNT: 14.3 % (ref 11.5–17)
GFR SERPLBLD CREATININE-BSD FMLA CKD-EPI: >60 MLS/MIN/1.73/M2
GLOBULIN SER-MCNC: 2.9 GM/DL (ref 2.4–3.5)
GLUCOSE SERPL-MCNC: 143 MG/DL (ref 82–115)
GRAM STN SPEC: NORMAL
HCO3 BLDA-SCNC: 27 MMOL/L (ref 22–26)
HCO3 BLDA-SCNC: NORMAL MMOL/L
HCT VFR BLD AUTO: 31.8 % (ref 42–52)
HGB BLD-MCNC: 10.8 G/DL (ref 14–18)
IMM GRANULOCYTES # BLD AUTO: 0.08 X10(3)/MCL (ref 0–0.04)
IMM GRANULOCYTES NFR BLD AUTO: 0.7 %
INHALED O2 CONCENTRATION: 40 %
INHALED O2 CONCENTRATION: NORMAL %
LYMPHOCYTES # BLD AUTO: 0.43 X10(3)/MCL (ref 0.6–4.6)
LYMPHOCYTES NFR BLD AUTO: 3.6 %
M AVIUM PARATB TISS QL ZN STN: NORMAL
MAGNESIUM SERPL-MCNC: 2.4 MG/DL (ref 1.6–2.6)
MCH RBC QN AUTO: 32 PG (ref 27–31)
MCHC RBC AUTO-ENTMCNC: 34 G/DL (ref 33–36)
MCV RBC AUTO: 94.1 FL (ref 80–94)
MECH RR (OHS): 24 B/MIN
MECH RR (OHS): NORMAL
METHGB MFR BLDA: 0.9 % (ref 0.4–1.5)
MODE (OHS): AC
MODE (OHS): NORMAL
MONOCYTES # BLD AUTO: 0.76 X10(3)/MCL (ref 0.1–1.3)
MONOCYTES NFR BLD AUTO: 6.4 %
NEUTROPHILS # BLD AUTO: 10.63 X10(3)/MCL (ref 2.1–9.2)
NEUTROPHILS NFR BLD AUTO: 88.8 %
NRBC BLD AUTO-RTO: 0 %
O2 HB BLOOD GAS (OHS): 96.5 % (ref 94–97)
OXYGEN DEVICE BLOOD GAS (OHS): ABNORMAL
OXYGEN DEVICE BLOOD GAS (OHS): NORMAL
OXYHGB MFR BLDA: 11.1 G/DL (ref 12–16)
PCO2 BLDA: 38 MMHG (ref 35–45)
PCO2 BLDA: NORMAL MM[HG]
PEEP RESPIRATORY: 8 CMH2O
PEEP RESPIRATORY: NORMAL CM[H2O]
PH BLDA: 7.46 [PH] (ref 7.35–7.45)
PH BLDA: NORMAL [PH]
PHOSPHATE SERPL-MCNC: 2 MG/DL (ref 2.3–4.7)
PLATELET # BLD AUTO: 157 X10(3)/MCL (ref 130–400)
PMV BLD AUTO: 12 FL (ref 7.4–10.4)
PO2 BLDA: 109 MMHG (ref 80–100)
PO2 BLDA: NORMAL MM[HG]
POCT GLUCOSE: 86 MG/DL (ref 70–110)
POTASSIUM BLOOD GAS (OHS): 3.9 MMOL/L (ref 3.5–5)
POTASSIUM BLOOD GAS (OHS): NORMAL
POTASSIUM SERPL-SCNC: 3.8 MMOL/L (ref 3.5–5.1)
PREALB SERPL-MCNC: 19.4 MG/DL (ref 16–42)
PROT SERPL-MCNC: 6.1 GM/DL (ref 5.8–7.6)
RBC # BLD AUTO: 3.38 X10(6)/MCL (ref 4.7–6.1)
SAMPLE SITE BLOOD GAS (OHS): ABNORMAL
SAMPLE SITE BLOOD GAS (OHS): NORMAL
SAO2 % BLDA: 98.5 %
SAO2 % BLDA: NORMAL %
SODIUM BLOOD GAS (OHS): 134 MMOL/L (ref 137–145)
SODIUM BLOOD GAS (OHS): NORMAL
SODIUM SERPL-SCNC: 138 MMOL/L (ref 136–145)
SPONT+MECH VT ON VENT: 500 ML
SPONT+MECH VT ON VENT: NORMAL
WBC # SPEC AUTO: 11.96 X10(3)/MCL (ref 4.5–11.5)

## 2024-02-05 PROCEDURE — 25000003 PHARM REV CODE 250: Performed by: SURGERY

## 2024-02-05 PROCEDURE — 85025 COMPLETE CBC W/AUTO DIFF WBC: CPT

## 2024-02-05 PROCEDURE — 94002 VENT MGMT INPAT INIT DAY: CPT

## 2024-02-05 PROCEDURE — 99900035 HC TECH TIME PER 15 MIN (STAT)

## 2024-02-05 PROCEDURE — 63600175 PHARM REV CODE 636 W HCPCS

## 2024-02-05 PROCEDURE — 99900026 HC AIRWAY MAINTENANCE (STAT)

## 2024-02-05 PROCEDURE — 37799 UNLISTED PX VASCULAR SURGERY: CPT

## 2024-02-05 PROCEDURE — 94761 N-INVAS EAR/PLS OXIMETRY MLT: CPT | Mod: XB

## 2024-02-05 PROCEDURE — 84100 ASSAY OF PHOSPHORUS: CPT

## 2024-02-05 PROCEDURE — 85576 BLOOD PLATELET AGGREGATION: CPT | Performed by: STUDENT IN AN ORGANIZED HEALTH CARE EDUCATION/TRAINING PROGRAM

## 2024-02-05 PROCEDURE — 63600175 PHARM REV CODE 636 W HCPCS: Performed by: SURGERY

## 2024-02-05 PROCEDURE — 80053 COMPREHEN METABOLIC PANEL: CPT

## 2024-02-05 PROCEDURE — 27100171 HC OXYGEN HIGH FLOW UP TO 24 HOURS

## 2024-02-05 PROCEDURE — 25000003 PHARM REV CODE 250: Performed by: NURSE PRACTITIONER

## 2024-02-05 PROCEDURE — 82803 BLOOD GASES ANY COMBINATION: CPT

## 2024-02-05 PROCEDURE — 83735 ASSAY OF MAGNESIUM: CPT

## 2024-02-05 PROCEDURE — 99024 POSTOP FOLLOW-UP VISIT: CPT | Mod: ICN,,, | Performed by: STUDENT IN AN ORGANIZED HEALTH CARE EDUCATION/TRAINING PROGRAM

## 2024-02-05 PROCEDURE — 25000003 PHARM REV CODE 250

## 2024-02-05 PROCEDURE — 86140 C-REACTIVE PROTEIN: CPT

## 2024-02-05 PROCEDURE — 84134 ASSAY OF PREALBUMIN: CPT

## 2024-02-05 PROCEDURE — 20800000 HC ICU TRAUMA

## 2024-02-05 PROCEDURE — 63600175 PHARM REV CODE 636 W HCPCS: Performed by: STUDENT IN AN ORGANIZED HEALTH CARE EDUCATION/TRAINING PROGRAM

## 2024-02-05 RX ORDER — DEXMEDETOMIDINE HYDROCHLORIDE 4 UG/ML
0-1.4 INJECTION, SOLUTION INTRAVENOUS CONTINUOUS
Status: DISCONTINUED | OUTPATIENT
Start: 2024-02-05 | End: 2024-02-06

## 2024-02-05 RX ORDER — ONDANSETRON HYDROCHLORIDE 2 MG/ML
INJECTION, SOLUTION INTRAVENOUS
Status: DISCONTINUED
Start: 2024-02-05 | End: 2024-02-05 | Stop reason: WASHOUT

## 2024-02-05 RX ORDER — FENTANYL CITRATE 50 UG/ML
25 INJECTION, SOLUTION INTRAMUSCULAR; INTRAVENOUS
Status: DISCONTINUED | OUTPATIENT
Start: 2024-02-05 | End: 2024-02-06

## 2024-02-05 RX ORDER — CEFAZOLIN SODIUM 2 G/50ML
2 SOLUTION INTRAVENOUS
Status: COMPLETED | OUTPATIENT
Start: 2024-02-05 | End: 2024-02-06

## 2024-02-05 RX ADMIN — CEFAZOLIN SODIUM 2 G: 2 SOLUTION INTRAVENOUS at 10:02

## 2024-02-05 RX ADMIN — POTASSIUM PHOSPHATE, MONOBASIC AND POTASSIUM PHOSPHATE, DIBASIC 30 MMOL: 224; 236 INJECTION, SOLUTION, CONCENTRATE INTRAVENOUS at 11:02

## 2024-02-05 RX ADMIN — CEFAZOLIN SODIUM 2 G: 2 SOLUTION INTRAVENOUS at 02:02

## 2024-02-05 RX ADMIN — LEVETIRACETAM 500 MG: 100 INJECTION, SOLUTION INTRAVENOUS at 10:02

## 2024-02-05 RX ADMIN — MUPIROCIN: 20 OINTMENT TOPICAL at 10:02

## 2024-02-05 RX ADMIN — LEVETIRACETAM 500 MG: 100 INJECTION, SOLUTION INTRAVENOUS at 08:02

## 2024-02-05 RX ADMIN — FAMOTIDINE 20 MG: 10 INJECTION, SOLUTION INTRAVENOUS at 08:02

## 2024-02-05 RX ADMIN — CEFAZOLIN SODIUM 2 G: 2 SOLUTION INTRAVENOUS at 06:02

## 2024-02-05 RX ADMIN — MUPIROCIN: 20 OINTMENT TOPICAL at 08:02

## 2024-02-05 RX ADMIN — SODIUM CHLORIDE 250 ML: 9 INJECTION, SOLUTION INTRAVENOUS at 11:02

## 2024-02-05 RX ADMIN — PROPOFOL 50 MCG/KG/MIN: 10 INJECTION, EMULSION INTRAVENOUS at 04:02

## 2024-02-05 RX ADMIN — DEXMEDETOMIDINE HYDROCHLORIDE 0.2 MCG/KG/HR: 400 INJECTION INTRAVENOUS at 10:02

## 2024-02-05 RX ADMIN — FAMOTIDINE 20 MG: 10 INJECTION, SOLUTION INTRAVENOUS at 10:02

## 2024-02-05 RX ADMIN — HYDRALAZINE HYDROCHLORIDE 10 MG: 20 INJECTION INTRAMUSCULAR; INTRAVENOUS at 01:02

## 2024-02-05 NOTE — ANESTHESIA PREPROCEDURE EVALUATION
Patient S/P CRANIOTOMY FOR RT SUBDURAL HEMATOMA ON 2/3/24.. PT EXTUBATED YESTERDAY   GOING BACK TO OR FOR RE-EVACUATION OF HEMATOMA DUE TO INCREASING SIZE OF HEMATOMA ON CT OF HEAD                                                                                                    02/04/2024  Rico Briggs .68-year-old male who was transferred from an outside hospital after an unwitnessed fall at some point yesterday resulting in a subdural hematoma, posterior head laceration repair with staples at outside hospital.  Upon arrival her transfer patient was alert, but over the course monitoring the patient he had a worsening change in neuro status, neurosurgery was consulted again after repeat CT head showed worsening of subdural hematoma.  Past medical history of this patient has unknown other than a pacemaker in place.     Procedure Information    Case: [id number] Date/Time: 02/04/24 2125   Procedure: CRANIECTOMY (Right: Head)   Anesthesia type: General   Diagnosis: Subdural hematoma [S06.5XAA]   Pre-op diagnosis: Subdural hematoma [S06.5XAA]   Location: [facility] OR  / [facility] OR   Surgeons: Lauri Farrell MD       Pre-op Assessment    I have reviewed the Patient Summary Reports.     I have reviewed the Nursing Notes. I have reviewed the NPO Status.   I have reviewed the Medications.     Review of Systems  Anesthesia Hx:  No problems with previous Anesthesia                Hematology/Oncology:  Hematology Normal   Oncology Normal                                   EENT/Dental:  EENT/Dental Normal           Cardiovascular:  Cardiovascular Normal Exercise tolerance: poor                  PACER Functional Capacity unable to determine                      Disorder of Cardiac Conduction    Pulmonary:  Pulmonary Normal                       Renal/:   Denies Chronic Renal Disease.                Hepatic/GI:  Hepatic/GI Normal                 Musculoskeletal:  Musculoskeletal Normal                 Neurological:  Neurology Normal          See PI                            Endocrine:  Endocrine Normal          Denies Morbid Obesity / BMI > 40  Dermatological:  Skin Normal    Psych:  Psychiatric Normal                 Latest Reference Range & Units Most Recent   WBC 4.50 - 11.50 x10(3)/mcL 13.49 (H)  2/4/24 20:27   RBC 4.70 - 6.10 x10(6)/mcL 3.84 (L)  2/4/24 20:27   Hemoglobin 14.0 - 18.0 g/dL 12.2 (L)  2/4/24 20:27   Hematocrit 42.0 - 52.0 % 36.9 (L)  2/4/24 20:27   MCV 80.0 - 94.0 fL 96.1 (H)  2/4/24 20:27   MCH 27.0 - 31.0 pg 31.8 (H)  2/4/24 20:27   MCHC 33.0 - 36.0 g/dL 33.1  2/4/24 20:27   RDW 11.5 - 17.0 % 14.3  2/4/24 20:27   Platelet Count 130 - 400 x10(3)/mcL 147  2/4/24 20:27   Immature Platelet Fraction 0.9 - 11.2 % 8.1  2/4/24 20:27   MPV 7.4 - 10.4 fL 11.5 (H)  2/4/24 20:27   Neut % % 84.4  2/4/24 20:27   LYMPH % % 5.6  2/4/24 20:27   Mono % % 8.7  2/4/24 20:27   Eos % % 0.3  2/4/24 20:27   Basophil % % 0.4  2/4/24 20:27   Immature Granulocytes % 0.6  2/4/24 20:27   Neut # 2.1 - 9.2 x10(3)/mcL 11.38 (H)  2/4/24 20:27   Lymph # 0.6 - 4.6 x10(3)/mcL 0.75  2/4/24 20:27   Mono # 0.1 - 1.3 x10(3)/mcL 1.18  2/4/24 20:27   Eos # 0 - 0.9 x10(3)/mcL 0.04  2/4/24 20:27   Baso # <=0.2 x10(3)/mcL 0.06  2/4/24 20:27   Immature Grans (Abs) 0 - 0.04 x10(3)/mcL 0.08 (H)  2/4/24 20:27   nRBC % 0.0  2/4/24 20:27   PT 12.5 - 14.5 seconds 15.2 (H)  2/4/24 20:27   INR <=1.3  1.2  2/4/24 20:27   PTT 23.2 - 33.7 seconds 25.7  2/3/24 02:15   Sodium 136 - 145 mmol/L 139  2/4/24 02:06   Potassium 3.5 - 5.1 mmol/L 3.6  2/4/24 02:06   Chloride 98 - 107 mmol/L 109 (H)  2/4/24 02:06   CO2 23 - 31 mmol/L 22 (L)  2/4/24 02:06   BUN 8.4 - 25.7 mg/dL 10.1  2/4/24 02:06   Creatinine 0.73 - 1.18 mg/dL 0.65 (L)  2/4/24 02:06   eGFR mls/min/1.73/m2 >60  2/4/24 02:06   Glucose 82 - 115 mg/dL 126 (H)  2/4/24 02:06   Calcium 8.8 - 10.0 mg/dL 8.3 (L)  2/4/24 02:06   Calcium Level Ionized 1.12 - 1.23 mmol/L 1.08 (L)  2/4/24 05:36   ALP 40  - 150 unit/L 41  2/4/24 02:06   PROTEIN TOTAL 5.8 - 7.6 gm/dL 6.0  2/4/24 02:06   Albumin 3.4 - 4.8 g/dL 3.4  2/4/24 02:06   Albumin/Globulin Ratio 1.1 - 2.0 ratio 1.3  2/4/24 02:06   BILIRUBIN TOTAL <=1.5 mg/dL 1.4  2/4/24 02:06   AST 5 - 34 unit/L 23  2/4/24 02:06   ALT 0 - 55 unit/L 27  2/4/24 02:06   Globulin, Total 2.4 - 3.5 gm/dL 2.6  2/4/24 02:06   Troponin I 0.000 - 0.045 ng/mL 0.335 (H)  2/3/24 06:55   Lactic Acid Level 0.5 - 2.2 mmol/L 2.0  2/3/24 02:30   Group & Rh  A POS  2/3/24 02:15   Indirect Alin GEL  NEG  2/3/24 02:15   Specimen Outdate  02/06/2024 23:59  2/3/24 02:15   Color, UA Yellow, Light-Yellow, Colorless, Straw, Dark-Yellow  Light-Yellow  2/3/24 10:06   Appearance, UA Clear  Clear  2/3/24 10:06   Specific Gravity,UA 1.005 - 1.030  1.021  2/3/24 10:06   pH, UA 5.0 - 8.5  6.5  2/3/24 10:06   Protein, UA Negative  Negative  2/3/24 10:06   Glucose, UA Negative, Normal  Normal  2/3/24 10:06   Ketones, UA Negative  Negative  2/3/24 10:06   Blood, UA Negative  2+ !  2/3/24 10:06   NITRITE UA Negative  Negative  2/3/24 10:06   Bilirubin, UA Negative  Negative  2/3/24 10:06   Urobilinogen, UA 0.2, 1.0, Normal  Normal  2/3/24 10:06   Leukocyte Esterase, UA Negative  250 !  2/3/24 10:06   RBC, UA None Seen, 0-2, 3-5, 0-5 /HPF 11-20 !  2/3/24 10:06   WBC, UA None Seen, 0-2, 3-5, 0-5 /HPF 11-20 !  2/3/24 10:06   Bacteria, UA None Seen, Trace /HPF Trace  2/3/24 10:06   Squamous Epithelial Cells, UA None Seen /HPF Trace  2/3/24 10:06   CULTURE, URINE  Rpt  2/3/24 10:06   Sample Type  Arterial Blood  2/4/24 05:36   Sample site  Arterial Line  2/4/24 05:36   Drawn by  swcrt  2/4/24 05:36   POC PH 7.350 - 7.450  7.500 (H)  2/4/24 05:36   POC PCO2 35.0 - 45.0 mmHg 34.0 (L)  2/4/24 05:36   POC PO2 80.0 - 100.0 mmHg 62.0 (L)  2/4/24 05:36   POC HCO3 22.0 - 26.0 mmol/L 26.5 (H)  2/4/24 05:36   Base Excess, Blood gas mmol/L 3.50  2/4/24 05:36   Sodium, Blood Gas 137 - 145 mmol/L 138  2/4/24 05:36   Potassium,  Blood Gas 3.5 - 5.0 mmol/L 3.6  2/4/24 05:36   TOC2, Blood gas mmol/L 27.5  2/4/24 05:36   THb, Blood gas 12 - 16 g/dL 13.6  2/3/24 06:16   O2 Hb, Blood Gas 94.0 - 97.0 % 94.4  2/3/24 06:16   CO Hgb 0.5 - 1.5 % 1.9 (H)  2/3/24 06:16   Met Hgb 0.4 - 1.5 % 0.9  2/3/24 06:16   Allens Test  Yes  2/4/24 04:37   MODE  SIMV  2/4/24 04:37   Oxygen Device, Blood gas  Ventilator  2/4/24 04:37   FIO2, Blood gas % 50  2/4/24 04:37   Mech Vt ml 500  2/4/24 04:37   Mech RR b/min 6  2/4/24 04:37   PEEP cmH2O 5.0  2/4/24 04:37   sO2, Blood gas % 93.0  2/4/24 05:36   CT HEAD WITHOUT CONTRAST  Rpt  2/4/24 19:33   CT PREVIOUS  Rpt  2/2/24 23:43   XR CHEST 1 VIEW  Rpt  2/4/24 04:23   XRAY PREVIOUS  Rpt  2/2/24 23:43   EKG 12-LEAD  Rpt  2/3/24 01:53   (H): Data is abnormally high  (L): Data is abnormally low  !: Data is abnormal  st Reason : T14.90XA,    Vent. Rate : 078 BPM     Atrial Rate : 078 BPM     P-R Int : 114 ms          QRS Dur : 196 ms      QT Int : 498 ms       P-R-T Axes : 000 207 057 degrees     QTc Int : 567 ms    Atrial-sensed ventricular-paced rhythm  Abnormal ECG  No previous ECGs available  Confirmed by Shamir Angeles MD ([id number]) on 2/3/2024 4:15:05 PM    Referred By: PROVIDER NOTINSYSTEM           Confirmed By:Shamir Angeles MD      Specimen Collected: 02/03/24 01:53 CST        Rpt: View report in Results Review for more information    Physical Exam  General: Well nourished and Cooperative    Airway:  Mallampati: III   Mouth Opening: Normal  TM Distance: Normal  Tongue: Normal  Neck ROM: Normal ROM    Dental:  Intact    Chest/Lungs:  Clear to auscultation, Normal Respiratory Rate    Heart:  Rate: Normal  Rhythm: Regular Rhythm      Anesthesia Plan  Type of Anesthesia, risks & benefits discussed:    Anesthesia Type: Gen ETT  Intra-op Monitoring Plan: Standard ASA Monitors  Post Op Pain Control Plan: multimodal analgesia  Induction:  IV and Inhalation  Airway Plan: Direct  Informed Consent: Informed consent signed with  the Patient and all parties understand the risks and agree with anesthesia plan.  All questions answered. Patient consented to blood products? Yes  ASA Score: 4  Day of Surgery Review of History & Physical: H&P Update referred to the surgeon/provider.I have interviewed and examined the patient. I have reviewed the patient's H&P dated: There are no significant changes.     Ready For Surgery From Anesthesia Perspective.   PT HAS BEEN ON ASA, DISCUSSED WITH SURGEON PLATELET TRANSFUSION  .

## 2024-02-05 NOTE — TERTIARY TRAUMA SURVEY NOTE
TERTIARY TRAUMA SURVEY (TTS)    List Injuries Identified to Date:   1. Subdural right side  2. Intraparencymal hemmorhage    []Problems list reviewed  List Operations and Procedures:   1. craniotomy    No past surgical history on file.    Incidental findings:   1. none    Past Medical History:   1. none    Active Ambulatory Problems     Diagnosis Date Noted    No Active Ambulatory Problems     Resolved Ambulatory Problems     Diagnosis Date Noted    No Resolved Ambulatory Problems     No Additional Past Medical History     No past medical history on file.    Tertiary Physical Exam:     Physical Exam  General:   intubated and sedated  HEENT:  Posterior head lag with staples, ET tube in place, OG tube in place  CV:  RR, 2+ DPs bilaterally  Resp/chest:  Bilateral chest rise  GI:  Abdomen soft,, non-distended, we will healed surgical scars in place  :  Deferred  MSK:  Unable to assess given sedation at this time  Neuro: GCS 3T, while on sedation  Skin/Wounds:  Posterior head laceration with staples     Imaging Review:     Imaging Results              CT Head Without Contrast (Final result)  Result time 02/03/24 08:46:21      Final result by Ella Soto MD (02/03/24 08:46:21)                   Impression:      Slight increase in size of right cerebral convexity subdural hemorrhage and right frontal parenchymal hemorrhage, with increased mass effect and 1.2 cm of leftward midline shift.    No significant change from the [facility] interpretation      Electronically signed by: Ella Soto  Date:    02/03/2024  Time:    08:46               Narrative:    EXAMINATION:  CT HEAD WITHOUT CONTRAST    CLINICAL HISTORY:  Subdural hemorrhage;    TECHNIQUE:  Axial scans were obtained from skull base to the vertex.    Coronal and sagittal reconstructions obtained from the axial data.    Automatic exposure control was utilized to limit radiation dose.    Contrast: None    Radiation Dose:    Total DLP: 1345  mGy*cm    COMPARISON:  Outside CT head dated 02/02/2024    FINDINGS:  There has been interval increase in size of subdural hemorrhage along the right cerebral convexity, now measuring up to 1.7 cm in thickness.  Smaller subdural hemorrhages seen along the anterior falx.  There is increasing size of a parenchymal hemorrhage in the right frontal lobe, with increased surrounding edema.    There is increased mass effect with sulcal effacement in the right cerebral hemisphere, partial effacement the lateral ventricles and 1.2 cm of leftward midline shift.  The basal cisterns are effaced.  There is dilatation of the left temporal horn.  The calvarium and skull base are intact.  There is fluid layering in the paranasal sinuses.                        Preliminary result by Brett Jacob MD (02/03/24 01:15:52)                   Impression:    1. There is profound increase in the acute subdural hemorrhage surrounding the right cerebral hemisphere now measuring 1.4 cm in thickness (coronal series 5, image 39). There is associated pronounced interval increase in leftward subfalcine herniation now with a midline shift of 1.4 cm (series 3, image 34). There there is near-complete effacement of the right cerebral hemisphere sulci and there is near-complete effacement of the right lateral ventricle with enlargement of the left lateral ventricle consistent with obstructive hydrocephalus of the left lateral ventricle. There is also interval increase in size of a right intraparenchymal hemorrhagic component centered on image 34 series 3 measuring 2 cm in diameter on the current study. Correlate with clinical and laboratory findings as regards additional evaluation and follow-up to full resolution as indicated.  2. Details and other findings as noted above.               Narrative:    START OF REPORT:  Technique: CT of the head was performed without intravenous contrast with axial as well as coronal and sagittal  images.    Comparison: Comparison is with study dated 2024-02-02 18:33:26.    Dosage Information: Automated Exposure Control was utilized 1344.65 mGy.cm.    Clinical history: Subdural hemorrhage.    Findings:  Hemorrhage: There is profound increase in the acute subdural hemorrhage surrounding the right cerebral hemisphere now measuring 1.4 cm in thickness (coronal series 5, image 39). There is associated pronounced interval increase in leftward subfalcine herniation now with a midline shift of 1.4 cm (series 3, image 34). There there is near-complete effacement of the right cerebral hemisphere sulci and there is near-complete effacement of the right lateral ventricle with enlargement of the left lateral ventricle consistent with obstructive hydrocephalus of the left lateral ventricle. There is also interval increase in size of a right intraparenchymal hemorrhagic component centered on image 34 series 3 measuring 2 cm in diameter on the current study.  Brain parenchyma: There is preservation of the grey white junction throughout. No acute infarct.  Cerebellum: Unremarkable.  Vascular: Unremarkable venous sinuses. Mild stable appearing atheromatous calcification of the intracranial arteries is seen.  Sella and skull base: The sella appears to be within normal limits for age.  Cerebellopontine angles: Within normal limits.  Intracranial calcifications: Incidental note is made of bilateral choroid plexus calcification. Incidental note is made of some pineal region calcification.  Calvarium: No acute linear or depressed skull fracture is seen.    Maxillofacial Structures:  Paranasal sinuses: There is stable appearing opacity and air fluid levels bilateral maxillary sinuses and ethmoid air cells.  Orbits: The orbits appear unremarkable.  Zygomatic arches: The zygomatic arches are intact and unremarkable.  Temporal bones and mastoids: The temporal bones and mastoids appear unremarkable.  TMJ: The mandibular condyles appear  "normally placed with respect to the mandibular fossa.  Nasal Bones: The nasal septum is midline. No displaced nasal bone fracture is seen.    Visualized upper cervical spine: The visualized cervical spine appears unremarkable.    Miscellaneous: An nasogastric tube is seen in place.    Notifications: The results were discussed with the emergency room physician (Dr Roper) prior to dictation at 2024-02-03 01:12:04 CST.                                         Lab Review:   CBC:  Recent Labs   Lab Result Units 02/03/24  0215 02/03/24  0655 02/04/24  0206   WBC x10(3)/mcL 15.99* 11.16 16.38*   RBC x10(6)/mcL 4.70 4.38* 4.05*   Hgb g/dL 14.8 13.9* 12.7*   Hct % 44.3 40.2* 38.1*   Platelet x10(3)/mcL 169 124* 155   MCV fL 94.3* 91.8 94.1*   MCH pg 31.5* 31.7* 31.4*   MCHC g/dL 33.4 34.6 33.3       CMP:  Recent Labs   Lab Result Units 02/03/24  0234 02/03/24  0655 02/04/24  0206   Calcium Level Total mg/dL 8.6* 8.1* 8.3*   Albumin Level g/dL 3.7 3.5 3.4   Sodium Level mmol/L 140 139 139   Potassium Level mmol/L 4.5 4.2 3.6   Carbon Dioxide mmol/L 23 22* 22*   Blood Urea Nitrogen mg/dL 15.9 14.1 10.1   Creatinine mg/dL 0.84 0.74 0.65*   Alkaline Phosphatase unit/L 51 49 41   Alanine Aminotransferase unit/L 39 35 27   Aspartate Aminotransferase unit/L 35* 29 23   Bilirubin Total mg/dL 1.6* 1.4 1.4       Troponin:  Recent Labs   Lab Result Units 02/03/24  0033 02/03/24  0655   Troponin-I ng/mL 0.552* 0.335*       ETOH:  No results for input(s): "ETHANOL" in the last 72 hours.     Urine Drug Screen:  No results for input(s): "COCAINE", "OPIATE", "BARBITURATE", "AMPHETAMINE", "FENTANYL", "CANNABINOIDS", "MDMA" in the last 72 hours.    Invalid input(s): "BENZODIAZEPINE", "PHENCYCLIDINE"     DAILY Risk Assessment:   DAILY Risk Factors: Hypertension                                      2  Nephrotoxic exposure                        3  Mechanical ventilation                        2  *Minimum score 0; Maximum score 21*  Total score: " 7  Plan:   Right subdural - to OR emergently completely  Right intraparenchymal hemmorhage - monitor for expansion

## 2024-02-05 NOTE — BRIEF OP NOTE
[facility] - [facility]  Brief Operative Note    SUMMARY     Surgery Date: 2/4/2024     Surgeon(s) and Role:     * Lauri Farrell MD - Primary    Assisting Surgeon: None    Pre-op Diagnosis:  Subdural hematoma [S06.5XAA]    Post-op Diagnosis:  Post-Op Diagnosis Codes:     * Subdural hematoma [S06.5XAA]    Procedure(s) (LRB):  CRANIECTOMY (Right)    Anesthesia: General    Implants:  Implant Name Type Inv. Item Serial No.  Lot No. LRB No. Used Action   DURAMATRIX ONLAY PLUS 5X7 - SN/A  DURAMATRIX ONLAY PLUS 5X7 N/A [facility] INSTRU/[employer] [id number] Right 1 Implanted     Operative Findings:  Generalized oozing  Extradural hematoma evacuated  Cerebral edema  Bone flap removed and sent to freezer    Estimated Blood Loss: 250 mL    Estimated Blood Loss has been documented.         Specimens: none    PLAN  Trauma ICU  Q1h Neuro checks, wean sedation as able to obtain exam  CT head 6am  2 Hemovacs to full suction  HOB 30 degrees  SBP < 140  Keppra 500mg BID  Ancef 48h  SCDs     Lauri Bangura MD  Neurosurgery

## 2024-02-05 NOTE — NURSING
Nurses Note -- 4 Eyes      2/5/2024   6:13 AM      Skin assessed during: Q Shift Change      [] No Altered Skin Integrity Present    [x]Prevention Measures Documented      [x] Yes- Altered Skin Integrity Present or Discovered   [] LDA Added if Not in Epic (Describe Wound)   [] New Altered Skin Integrity was Present on Admit and Documented in LDA   [] Wound Image Taken    Wound Care Consulted? No    Attending Nurse:  Anton Alvarez RN/Staff Member:  pilar treviño

## 2024-02-05 NOTE — ANESTHESIA PROCEDURE NOTES
Intubation    Date/Time: 2/4/2024 10:00 PM    Performed by: Jelani Aguilar CRNA  Authorized by: Randy Rodriguez MD    Intubation:     Induction:  Rapid sequence induction    Intubated:  Postinduction    Mask Ventilation:  Not attempted    Attempts:  1    Attempted By:  CRNA    Method of Intubation:  Video laryngoscopy    Blade:  Dhillon 3    Laryngeal View Grade: Grade I - full view of cords      Difficult Airway Encountered?: No      Complications:  None    Airway Device:  Oral endotracheal tube    Airway Device Size:  8.0    Style/Cuff Inflation:  Cuffed (inflated to minimal occlusive pressure)    Tube secured:  23    Secured at:  The lips    Placement Verified By:  Capnometry    Complicating Factors:  None    Findings Post-Intubation:  BS equal bilateral and atraumatic/condition of teeth unchanged

## 2024-02-05 NOTE — PT/OT/SLP PROGRESS
Physical Therapy      Patient Name:  Rico Briggs   MRN:  [id number]    Patient not seen today secondary to awaiting helmet to be delivered from [facility] prior to mobilization . Will follow-up as appropriate.

## 2024-02-05 NOTE — PROGRESS NOTES
POD#1 right craniectomy for SDH  POD#2 right craniotomy for SDH  He is currently intubated and sedated  No acute events overnight    AFVSS  Exam limited d/t sedation  With sedation briefly held he does open his eyes to sternal rub. He is purposeful with bilateral UE. Moves all ext spontaneously. Questionable command following in the right UE.  Turban dressing intact, dry  Drain output: #1: 0/12hrs, #2: 10/40    CT head:  Impression:       Postoperative changes following right-sided craniectomy with decreasing mass effect.       Plan: Continue drains  Continue current turban dressing. Reinforce prn  Continue to wean sedation as tolerated  BP parameters below 140/90  Keppra BID  SCDs for DVT prophylaxis  Repeat CT head tomorrow AM

## 2024-02-05 NOTE — PT/OT/SLP PROGRESS
Occupational Therapy      Patient Name:  Rico Briggs   MRN:  [id number]    Patient not seen today 2/2 awaiting helmet. Will follow-up as appropriate.    2/5/2024

## 2024-02-05 NOTE — TRANSFER OF CARE
Anesthesia Transfer of Care Note    Patient: Rico Briggs    Procedure(s) Performed: Procedure(s) (LRB):  CRANIECTOMY (Right)    Patient location: ICU    Anesthesia Type: general    Transport from OR: Transported from OR intubated on 100% O2 by AMBU with adequate controlled ventilation. Upon arrival to PACU/ICU, patient attached to ventilator and auscultated to confirm bilateral breath sounds and adequate TV. Continuous ECG monitoring in transport. Continuous SpO2 monitoring in transport. Continuos invasive BP monitoring in transport    Post pain: adequate analgesia    Post assessment: no apparent anesthetic complications    Post vital signs: stable    Level of consciousness: responds to stimulation    Nausea/Vomiting: no nausea/vomiting    Complications: none    Transfer of care protocol was followed      Last vitals: Visit Vitals  BP (!) 125/57 (BP Location: Left arm, Patient Position: Lying)   Pulse 85   Temp 36.9 °C (98.4 °F) (Oral)   Resp 20   Ht 6' (1.829 m)   Wt 103 kg (227 lb 1.2 oz)   SpO2 100%   BMI 30.80 kg/m²

## 2024-02-06 PROBLEM — E83.51 HYPOCALCEMIA: Status: ACTIVE | Noted: 2024-02-06

## 2024-02-06 PROBLEM — R13.10 DYSPHAGIA: Status: ACTIVE | Noted: 2024-02-06

## 2024-02-06 LAB
ALBUMIN SERPL-MCNC: 3 G/DL (ref 3.4–4.8)
ALBUMIN/GLOB SERPL: 1.3 RATIO (ref 1.1–2)
ALLENS TEST BLOOD GAS (OHS): ABNORMAL
ALP SERPL-CCNC: 42 UNIT/L (ref 40–150)
ALT SERPL-CCNC: 13 UNIT/L (ref 0–55)
AST SERPL-CCNC: 12 UNIT/L (ref 5–34)
BASE EXCESS BLD CALC-SCNC: 2.8 MMOL/L (ref -2–2)
BASOPHILS # BLD AUTO: 0.05 X10(3)/MCL
BASOPHILS NFR BLD AUTO: 0.5 %
BILIRUB SERPL-MCNC: 0.8 MG/DL
BLOOD GAS SAMPLE TYPE (OHS): ABNORMAL
BNP BLD-MCNC: 266.8 PG/ML
BUN SERPL-MCNC: 13.6 MG/DL (ref 8.4–25.7)
CA-I BLD-SCNC: 1.1 MMOL/L (ref 1.12–1.23)
CALCIUM SERPL-MCNC: 8.1 MG/DL (ref 8.8–10)
CHLORIDE SERPL-SCNC: 110 MMOL/L (ref 98–107)
CO2 BLDA-SCNC: 26.5 MMOL/L
CO2 SERPL-SCNC: 23 MMOL/L (ref 23–31)
COHGB MFR BLDA: 1.5 % (ref 0.5–1.5)
CPAP BLOOD GAS (OHS): 5 CM H2O
CREAT SERPL-MCNC: 0.72 MG/DL (ref 0.73–1.18)
DRAWN BY BLOOD GAS (OHS): ABNORMAL
EOSINOPHIL # BLD AUTO: 0.12 X10(3)/MCL (ref 0–0.9)
EOSINOPHIL NFR BLD AUTO: 1.2 %
ERYTHROCYTE [DISTWIDTH] IN BLOOD BY AUTOMATED COUNT: 14 % (ref 11.5–17)
GFR SERPLBLD CREATININE-BSD FMLA CKD-EPI: >60 MLS/MIN/1.73/M2
GLOBULIN SER-MCNC: 2.4 GM/DL (ref 2.4–3.5)
GLUCOSE SERPL-MCNC: 92 MG/DL (ref 82–115)
HCO3 BLDA-SCNC: 25.5 MMOL/L (ref 22–26)
HCT VFR BLD AUTO: 30.9 % (ref 42–52)
HGB BLD-MCNC: 10.3 G/DL (ref 14–18)
IMM GRANULOCYTES # BLD AUTO: 0.05 X10(3)/MCL (ref 0–0.04)
IMM GRANULOCYTES NFR BLD AUTO: 0.5 %
INHALED O2 CONCENTRATION: 30 %
LYMPHOCYTES # BLD AUTO: 1.49 X10(3)/MCL (ref 0.6–4.6)
LYMPHOCYTES NFR BLD AUTO: 14.5 %
MCH RBC QN AUTO: 31.3 PG (ref 27–31)
MCHC RBC AUTO-ENTMCNC: 33.3 G/DL (ref 33–36)
MCV RBC AUTO: 93.9 FL (ref 80–94)
METHGB MFR BLDA: 1 % (ref 0.4–1.5)
MODE (OHS): ABNORMAL
MONOCYTES # BLD AUTO: 0.95 X10(3)/MCL (ref 0.1–1.3)
MONOCYTES NFR BLD AUTO: 9.2 %
NEUTROPHILS # BLD AUTO: 7.64 X10(3)/MCL (ref 2.1–9.2)
NEUTROPHILS NFR BLD AUTO: 74.1 %
NRBC BLD AUTO-RTO: 0 %
O2 HB BLOOD GAS (OHS): 97 % (ref 94–97)
OXYGEN DEVICE BLOOD GAS (OHS): ABNORMAL
OXYHGB MFR BLDA: 11 G/DL (ref 12–16)
PCO2 BLDA: 32 MMHG (ref 35–45)
PH BLDA: 7.51 [PH] (ref 7.35–7.45)
PLATELET # BLD AUTO: 156 X10(3)/MCL (ref 130–400)
PMV BLD AUTO: 12.4 FL (ref 7.4–10.4)
PO2 BLDA: 135 MMHG (ref 80–100)
POTASSIUM BLOOD GAS (OHS): 3.5 MMOL/L (ref 3.5–5)
POTASSIUM SERPL-SCNC: 3.7 MMOL/L (ref 3.5–5.1)
PROT SERPL-MCNC: 5.4 GM/DL (ref 5.8–7.6)
PS (OHS): 10 CMH2O
RBC # BLD AUTO: 3.29 X10(6)/MCL (ref 4.7–6.1)
SAMPLE SITE BLOOD GAS (OHS): ABNORMAL
SAO2 % BLDA: 99.3 %
SODIUM BLOOD GAS (OHS): 138 MMOL/L (ref 137–145)
SODIUM SERPL-SCNC: 141 MMOL/L (ref 136–145)
WBC # SPEC AUTO: 10.3 X10(3)/MCL (ref 4.5–11.5)

## 2024-02-06 PROCEDURE — 94761 N-INVAS EAR/PLS OXIMETRY MLT: CPT

## 2024-02-06 PROCEDURE — 63600175 PHARM REV CODE 636 W HCPCS

## 2024-02-06 PROCEDURE — 99024 POSTOP FOLLOW-UP VISIT: CPT | Mod: ICN,,, | Performed by: STUDENT IN AN ORGANIZED HEALTH CARE EDUCATION/TRAINING PROGRAM

## 2024-02-06 PROCEDURE — 85025 COMPLETE CBC W/AUTO DIFF WBC: CPT

## 2024-02-06 PROCEDURE — 82803 BLOOD GASES ANY COMBINATION: CPT

## 2024-02-06 PROCEDURE — 25000003 PHARM REV CODE 250

## 2024-02-06 PROCEDURE — 25000003 PHARM REV CODE 250: Mod: JZ,JG | Performed by: NURSE PRACTITIONER

## 2024-02-06 PROCEDURE — 97530 THERAPEUTIC ACTIVITIES: CPT

## 2024-02-06 PROCEDURE — 63600175 PHARM REV CODE 636 W HCPCS: Performed by: STUDENT IN AN ORGANIZED HEALTH CARE EDUCATION/TRAINING PROGRAM

## 2024-02-06 PROCEDURE — 97163 PT EVAL HIGH COMPLEX 45 MIN: CPT

## 2024-02-06 PROCEDURE — 27100171 HC OXYGEN HIGH FLOW UP TO 24 HOURS

## 2024-02-06 PROCEDURE — 97168 OT RE-EVAL EST PLAN CARE: CPT

## 2024-02-06 PROCEDURE — 99900035 HC TECH TIME PER 15 MIN (STAT)

## 2024-02-06 PROCEDURE — 25000003 PHARM REV CODE 250: Performed by: SURGERY

## 2024-02-06 PROCEDURE — 83880 ASSAY OF NATRIURETIC PEPTIDE: CPT | Performed by: SURGERY

## 2024-02-06 PROCEDURE — 97535 SELF CARE MNGMENT TRAINING: CPT

## 2024-02-06 PROCEDURE — 99900026 HC AIRWAY MAINTENANCE (STAT)

## 2024-02-06 PROCEDURE — 92610 EVALUATE SWALLOWING FUNCTION: CPT

## 2024-02-06 PROCEDURE — 80053 COMPREHEN METABOLIC PANEL: CPT

## 2024-02-06 PROCEDURE — 20800000 HC ICU TRAUMA

## 2024-02-06 PROCEDURE — 94003 VENT MGMT INPAT SUBQ DAY: CPT

## 2024-02-06 PROCEDURE — 94799 UNLISTED PULMONARY SVC/PX: CPT

## 2024-02-06 PROCEDURE — 37799 UNLISTED PX VASCULAR SURGERY: CPT

## 2024-02-06 PROCEDURE — 63600175 PHARM REV CODE 636 W HCPCS: Performed by: SURGERY

## 2024-02-06 RX ORDER — FUROSEMIDE 40 MG/1
40 TABLET ORAL 2 TIMES DAILY
Status: DISCONTINUED | OUTPATIENT
Start: 2024-02-06 | End: 2024-02-06

## 2024-02-06 RX ORDER — ALLOPURINOL 100 MG/1
300 TABLET ORAL DAILY
Status: DISCONTINUED | OUTPATIENT
Start: 2024-02-07 | End: 2024-02-09 | Stop reason: HOSPADM

## 2024-02-06 RX ORDER — FENTANYL CITRATE 50 UG/ML
25 INJECTION, SOLUTION INTRAMUSCULAR; INTRAVENOUS
Status: DISCONTINUED | OUTPATIENT
Start: 2024-02-06 | End: 2024-02-09 | Stop reason: HOSPADM

## 2024-02-06 RX ORDER — SODIUM CHLORIDE 9 MG/ML
INJECTION, SOLUTION INTRAVENOUS CONTINUOUS
Status: DISCONTINUED | OUTPATIENT
Start: 2024-02-06 | End: 2024-02-09 | Stop reason: HOSPADM

## 2024-02-06 RX ORDER — CARVEDILOL 3.12 MG/1
3.12 TABLET ORAL 2 TIMES DAILY
Status: DISCONTINUED | OUTPATIENT
Start: 2024-02-06 | End: 2024-02-09 | Stop reason: HOSPADM

## 2024-02-06 RX ORDER — SPIRONOLACTONE 25 MG/1
25 TABLET ORAL DAILY
Status: DISCONTINUED | OUTPATIENT
Start: 2024-02-07 | End: 2024-02-09 | Stop reason: HOSPADM

## 2024-02-06 RX ORDER — FUROSEMIDE 40 MG/1
40 TABLET ORAL 2 TIMES DAILY
Status: DISCONTINUED | OUTPATIENT
Start: 2024-02-06 | End: 2024-02-09 | Stop reason: HOSPADM

## 2024-02-06 RX ORDER — CALCIUM GLUCONATE 20 MG/ML
1 INJECTION, SOLUTION INTRAVENOUS
Status: COMPLETED | OUTPATIENT
Start: 2024-02-06 | End: 2024-02-06

## 2024-02-06 RX ADMIN — FAMOTIDINE 20 MG: 10 INJECTION, SOLUTION INTRAVENOUS at 10:02

## 2024-02-06 RX ADMIN — CALCIUM GLUCONATE 1 G: 20 INJECTION, SOLUTION INTRAVENOUS at 07:02

## 2024-02-06 RX ADMIN — MUPIROCIN: 20 OINTMENT TOPICAL at 10:02

## 2024-02-06 RX ADMIN — LEVETIRACETAM 500 MG: 100 INJECTION, SOLUTION INTRAVENOUS at 09:02

## 2024-02-06 RX ADMIN — HYDRALAZINE HYDROCHLORIDE 10 MG: 20 INJECTION INTRAMUSCULAR; INTRAVENOUS at 08:02

## 2024-02-06 RX ADMIN — CEFAZOLIN SODIUM 2 G: 2 SOLUTION INTRAVENOUS at 01:02

## 2024-02-06 RX ADMIN — MUPIROCIN: 20 OINTMENT TOPICAL at 08:02

## 2024-02-06 RX ADMIN — FAMOTIDINE 20 MG: 10 INJECTION, SOLUTION INTRAVENOUS at 08:02

## 2024-02-06 RX ADMIN — CEFAZOLIN SODIUM 2 G: 2 SOLUTION INTRAVENOUS at 06:02

## 2024-02-06 RX ADMIN — SODIUM CHLORIDE: 9 INJECTION, SOLUTION INTRAVENOUS at 07:02

## 2024-02-06 RX ADMIN — CARVEDILOL 3.12 MG: 3.12 TABLET, FILM COATED ORAL at 08:02

## 2024-02-06 RX ADMIN — CALCIUM GLUCONATE 1 G: 20 INJECTION, SOLUTION INTRAVENOUS at 08:02

## 2024-02-06 RX ADMIN — FUROSEMIDE 40 MG: 40 TABLET ORAL at 05:02

## 2024-02-06 RX ADMIN — CEFAZOLIN SODIUM 2 G: 2 SOLUTION INTRAVENOUS at 11:02

## 2024-02-06 RX ADMIN — ACETAMINOPHEN 650 MG: 650 SOLUTION ORAL at 10:02

## 2024-02-06 NOTE — PROGRESS NOTES
POD#2 right craniectomy for SDH  POD#3 right craniotomy for SDH      Extubated- sitting up in the chair.    Patient has already ambulated down the halls.    He has no headache and does not really have any pain this morning.    His vital signs are stable     CT without contrast performed today 02/06/2024 stable exam.  Patient is answering questions and following commands neurologically intact.    Dressings are clean and dry  The patient is wearing his helmet while he is sitting up in the chair at this time   Two Hemovacs in place.  --#1.  Put out 0 in the last 12 hours, but dumped once patient ambulated today there is drainage in the chamber currently..  #2. Put out 77745/85 mL serosanguineous.          Plan:  Continue Hemovac drains   Continue every 2 hour neurological exams   Continue turban dressings keep clean and dry   Blood pressure less than 140/90.    Continue Keppra seizure precautions   Continue PT and mobilization  SCDs   Fall precautions       Post-Op Info:  Procedure(s) (LRB):  CRANIECTOMY (Right)   2 Days Post-Op      Medications:  Continuous Infusions:   sodium chloride 0.9% 125 mL/hr at 02/05/24 2325    clevidipine       Scheduled Meds:   ceFAZolin (Ancef) IV (PEDS and ADULTS)  2 g Intravenous Q8H    docusate sodium  100 mg Oral BID    famotidine (PF)  20 mg Intravenous BID    levETIRAcetam (Keppra) IV (PEDS and ADULTS)  500 mg Intravenous Q12H    mupirocin   Nasal BID    polyethylene glycol  17 g Oral BID     PRN Meds:0.9%  NaCl infusion (for blood administration), acetaminophen, bisacodyL, fentaNYL, hydrALAZINE, labetalol, melatonin, oxyCODONE     Review of Systems  Objective:     Weight: 103 kg (227 lb 1.2 oz)  Body mass index is 30.8 kg/m².  Vital Signs (Most Recent):  Temp: 99.5 °F (37.5 °C) (02/06/24 0802)  Pulse: 69 (02/06/24 0802)  Resp: 14 (02/06/24 0802)  BP: (!) 155/75 (02/06/24 0833)  SpO2: 98 % (02/06/24 0815) Vital Signs (24h Range):  Temp:  [98.2 °F (36.8 °C)-99.5 °F (37.5 °C)] 99.5 °F  "(37.5 °C)  Pulse:  [63-77] 69  Resp:  [10-24] 14  SpO2:  [94 %-100 %] 98 %  BP: (101-155)/(57-85) 155/75  Arterial Line BP: ()/(39-62) 143/58     Date 02/06/24 0700 - 02/07/24 0659   Shift 3307-8234 4482-9189 4904-5168 24 Hour Total   INTAKE   Shift Total(mL/kg)       OUTPUT   Urine(mL/kg/hr) 100   100   Shift Total(mL/kg) 100(1)   100(1)   Weight (kg) 103 103 103 103              Vent Mode: CPAP / PSV  Oxygen Concentration (%):  [30-40] 30  Resp Rate Total:  [12 br/min-25 br/min] 12 br/min  Vt Set:  [500 mL] 500 mL  PEEP/CPAP:  [5 cmH20-8 cmH20] 5 cmH20  Pressure Support:  [10 cmH20] 10 cmH20  Mean Airway Pressure:  [7 rnM68-57 cmH20] 7 cmH20             Closed/Suction Drain 02/04/24 2315 Right Scalp Accordion 10 Fr. (Active)   Site Description Unable to view 02/06/24 0400   Dressing Type Gauze 02/06/24 0830   Dressing Status Clean;Dry;Intact 02/06/24 0830   Dressing Intervention Integrity maintained 02/06/24 0830   Drainage Sanguineous 02/06/24 0400   Status To bulb suction 02/06/24 0830   Output (mL) 90 mL 02/06/24 0600            Closed/Suction Drain 02/04/24 2300 Tube - 2 Scalp Accordion (Active)   Site Description Unable to view 02/06/24 0400   Dressing Type Gauze 02/06/24 0830   Dressing Status Old drainage 02/06/24 0400   Dressing Intervention Integrity maintained 02/06/24 0830   Drainage Sanguineous 02/06/24 0830   Status To bulb suction 02/06/24 0830   Output (mL) 0 mL 02/06/24 0600            Urethral Catheter 02/04/24 2130 16 Fr. (Active)   Site Assessment Clean;Intact;Dry 02/06/24 0830   Collection Container Urimeter 02/06/24 0830   Securement Method secured to top of thigh w/ adhesive device 02/06/24 0400   Catheter Care Performed yes 02/06/24 0400   Reason for Continuing Urinary Catheterization Urinary retention 02/06/24 0830   CAUTI Prevention Bundle Securement Device in place with 1" slack;Intact seal between catheter & drainage tubing;Drainage bag/urimeter off the floor;Sheeting clip in " use;No dependent loops or kinks;Drainage bag/urimeter not overfilled (<2/3 full);Drainage bag/urimeter below bladder 02/05/24 2000   Output (mL) 100 mL 02/06/24 0803       Neurosurgery Physical Exam    Significant Labs:  Recent Labs   Lab 02/05/24 0123 02/06/24  0241    141   K 3.8 3.7   CO2 23 23   BUN 11.7 13.6   CREATININE 0.69* 0.72*   CALCIUM 8.2* 8.1*   MG 2.40  --      Recent Labs   Lab 02/04/24 2027 02/05/24 0123 02/06/24  0241   WBC 13.49* 11.96* 10.30   HGB 12.2* 10.8* 10.3*   HCT 36.9* 31.8* 30.9*    157 156     Recent Labs   Lab 02/04/24 2027   INR 1.2     Microbiology Results (last 7 days)       Procedure Component Value Units Date/Time    Anaerobic Culture [[id number]] Collected: 02/04/24 2333    Order Status: Completed Specimen: Bone from Skull Updated: 02/06/24 0708     Anaerobe Culture No Anaerobes Isolated    Tissue Culture - Aerobic [[id number]] Collected: 02/04/24 2333    Order Status: Completed Specimen: Bone from Skull Updated: 02/06/24 0634     CULTURE, TISSUE- AEROBIC ([facility]) No Growth At 24 Hours    Urine culture [[id number]]  (Abnormal) Collected: 02/03/24 1006    Order Status: Completed Specimen: Urine Updated: 02/05/24 1300     Urine Culture Less than 10,000 colonies/ml Micrococcus luteus     Comment: [place] counts <10,000/ml are of questionable significance and may or may not indicate contamination.  Therefore organisms are identified only.  If further workup is desired please notify Microbiology        AFB Smear [[id number]] Collected: 02/04/24 2333    Order Status: Completed Specimen: Bone from Skull Updated: 02/05/24 0932     AFB Smear No AFB seen (Direct smear only)    Gram Stain [[id number]] Collected: 02/04/24 2333    Order Status: Completed Specimen: Bone from Skull Updated: 02/05/24 0818     GRAM STAIN No WBCs, No bacteria seen    Fungal Culture [[id number]] Collected: 02/04/24 2333    Order Status: Sent Specimen: Bone from Skull Updated: 02/04/24 5757     Mycobacteria and Nocardia Culture [[id number]] Collected: 02/04/24 2333    Order Status: Sent Specimen: Bone from Skull Updated: 02/04/24 2342              Active Diagnoses:    Diagnosis Date Noted POA    PRINCIPAL PROBLEM:  Subdural hematoma [S06.5XAA] 02/03/2024 Yes      Problems Resolved During this Admission:       RENE Padron-BC  Neurosurgery  [facility] - [address] [place] [address]

## 2024-02-06 NOTE — PROGRESS NOTES
[facility] ICU PROGRESS NOTE    HD# 3  Admission Summary:   In brief, Rico Briggs is a 68 y.o. male admitted on 2/2/2024 following unwitnessed fall at some point yesterday resulting in a subdural hematoma, posterior head laceration repair with staples at outside hospital.  Upon arrival her transfer patient was alert, but over the course monitoring the patient he had a worsening change in neuro status, neurosurgery was consulted again after repeat CT head showed worsening of subdural hematoma.  Past medical history of this patient has unknown other than a pacemaker in place. .     Interval history:    Pt went back to surgery for reaccumulation of the subdural    Consults:   Neurosurgery Injuries:  Subdural hematoma    []Problems list reviewed Operations/Procedures:  Craniotomy  craniectomy     Past medical history:  Hypercholesterolemia  Cad  Hypertension  gout    Medications: [] Medications reviewed/updated   Home Meds:    Current Outpatient Medications   Medication Instructions    allopurinoL (ZYLOPRIM) 300 mg, Oral, Daily    aspirin 81 mg, Oral, Daily    atorvastatin (LIPITOR) 10 mg, Oral, Daily    carvediloL (COREG) 3.125 mg, Oral, 2 times daily with meals    furosemide (LASIX) 40 mg, Oral, 2 times daily    ramipriL (ALTACE) 2.5 mg, Oral, Daily    spironolactone (ALDACTONE) 25 mg, Oral, Daily      Scheduled Meds:    ceFAZolin (Ancef) IV (PEDS and ADULTS)  2 g Intravenous Q8H    docusate sodium  100 mg Oral BID    famotidine (PF)  20 mg Intravenous BID    levETIRAcetam (Keppra) IV (PEDS and ADULTS)  500 mg Intravenous Q12H    mupirocin   Nasal BID    polyethylene glycol  17 g Oral BID     Continuous Infusions:    sodium chloride 0.9% Stopped (02/03/24 0241)    sodium chloride 0.9% 125 mL/hr at 02/05/24 2325    clevidipine      dexmedeTOMIDine (Precedex) infusion (titrating) Stopped (02/05/24 2340)    fentanyl Stopped (02/03/24 0100)    propofoL Stopped (02/05/24 2125)     PRN Meds: 0.9%  NaCl infusion (for  blood administration), acetaminophen, bisacodyL, fentaNYL, hydrALAZINE, labetalol, melatonin, oxyCODONE     Vitals:  VITAL SIGNS: 24 HR MIN & MAX LAST   Temp  Min: 98.1 °F (36.7 °C)  Max: 99.3 °F (37.4 °C)  98.6 °F (37 °C)   BP  Min: 99/57  Max: 132/69  122/68    Pulse  Min: 58  Max: 77  70    Resp  Min: 10  Max: 24  16    SpO2  Min: 94 %  Max: 100 %  100 %      HT: 6' (182.9 cm)  WT: 103 kg (227 lb 1.2 oz)  BMI: 30.8   Ideal Body Weight (IBW), Male: 178 lb  % Ideal Body Weight, Male (lb): 127.57 %        General  Exam: intubated and sedated     Neuro/Psych  GCS: 10T (E 3) (V 1) (M 6)  Exam: moves all extremities follows colmmands  ICP monitor: No  ICP treatment: ICP Treatment: N/A  C-Collar: No    Plan:   wnl     HEENT  Exam: perrl    Plan:   Crani done otherwise drain in p[lace     Pulmonary  Vitals: Resp  Av.3  Min: 10  Max: 24  SpO2  Av.2 %  Min: 94 %  Max: 100 %    Ventilator/Oxygen Settings:   Vent Mode: CPAP / PSV  Vt Set: 500 mL  Set Rate: 24 BPM  Pressure Support: 10 cmH20  I:E Ratio Measured: 1:3.2 Vent Mode: CPAP / PSV (24)  Ventilator Initiated: Yes (24)  Set Rate: 24 BPM (24)  Vt Set: 500 mL (24)  Pressure Support: 10 cmH20 (24)  PEEP/CPAP: 5 cmH20 (24)  Oxygen Concentration (%): 30 (24)  Peak Airway Pressure: 16 cmH20 (24)  Total Ve: 7.9 L/m (24)  F/VT Ratio<105 (RSBI): (!) 17.94 (24)      PaO2/FiO2 ratio (if ventilated):   RSBI RR/TV (if ventilated):      ABG:   Recent Labs   Lab 24  0518   PH 7.510*   PO2 135.0*   PCO2 32.0*   HCO3 25.5        CXR:    No results found in the last 24 hours.      Rib fractures: none  Chest Tube: None     Exam: cta b Peep 8 tv 500     Plan:     On vent   Incentive Spirometry/RT Treatments:      Cardiovascular  Vitals: Pulse  Av.1  Min: 58  Max: 77  BP  Min: 99/57  Max: 132/69  Recent Labs   Lab 24  0033 24  0655 24  0241  "  TROPONINI 0.552* 0.335*  --    BNP  --   --  266.8*     Vasoactive Agents: None  Exam: rrr    Plan:   wnl     Renal  Recent Labs     24  0241   BUN 10.1 11.7 13.6   CREATININE 0.65* 0.69* 0.72*       No results for input(s): "LACTIC" in the last 72 hours.    Intake/Output - Last 3 Shifts             I.V. (mL/kg) 538.1 (5.2) 591.4 (5.7)     Blood 400      IV Piggyback 1647.4 538.6     Total Intake(mL/kg) 2585.5 (25.1) 1130 (11)     Urine (mL/kg/hr) 1375 (0.6) 1725 (0.7)     Drains 50 175     Stool 0 0     Total Output 1425 1900     Net +1160.5 -770            Stool Occurrence 0 x 0 x              Intake/Output Summary (Last 24 hours) at 2024 0757  Last data filed at 2024 0608  Gross per 24 hour   Intake 1130.04 ml   Output 1900 ml   Net -769.96 ml         Bay: No     DAILY Risk Assessment:   DAILY Risk Factors: Congestive heart failure                     2  Hypertension                                      2  Nephrotoxic exposure                        3  Mechanical ventilation                        2  *Minimum score 0; Maximum score 21*  Total score: 9    Plan:   wnl     FEN/GI  Recent Labs     24  0241    138 141   K 3.6 3.8 3.7   CO2 22* 23 23   CALCIUM 8.3* 8.2* 8.1*   MG  --  2.40  --    PHOS  --  2.0*  --    ALBUMIN 3.4 3.2* 3.0*   BILITOT 1.4 1.5 0.8   AST 23 17 12   ALKPHOS 41 47 42   ALT 27 20 13       Diet: NPO    Last BM: unknown    Abdominal Exam: soft nt nd    Plan:   wnl     Heme/Onc  Recent Labs     24  0123 24  0241   HGB 12.7* 12.2* 10.8* 10.3*   HCT 38.1* 36.9* 31.8* 30.9*    147 157 156   INR  --  1.2  --   --        Transfusions (over past 24h): None    Plan:   wnl     ID  Temp  Av.6 °F (37 °C)  Min: 98.1 °F (36.7 °C)  Max: 99.3 °F (37.4 °C)      Recent Labs     246 24 " 02/05/24  0123 02/06/24  0241   WBC 16.38* 13.49* 11.96* 10.30       Cultures: Antibiotics:     1.      Plan:   wnl     Endocrine  Recent Labs     02/04/24  0206 02/05/24  0123 02/06/24  0241   GLUCOSE 126* 143* 92      Recent Labs     02/04/24  1827   POCTGLUCOSE 86        Plan:   Glucose normal  Insulin treatment: sliding scale as needed     Musculoskeletal/Wounds  Weight bearing status:   RUE: WBAT  LUE: WBAT  RLE: WBAT  LLE: WBAT    [] Tertiary exam performed    Extremity/wound exam:   Plan:   wnl     Precautions  Standard     Prophylaxis  Seizure: Keppra (day 1/7)  DVT: Defer chemoprophylaxis to Neurosurgery   GI: PPI     Lines/drains/airway [] LDA reviewed/updated   Lines/Drains/Airways       Drain  Duration                  Closed/Suction Drain 02/04/24 2300 Tube - 2 Scalp Accordion 1 day         Closed/Suction Drain 02/04/24 2315 Right Scalp Accordion 10 Fr. 1 day         Urethral Catheter 02/04/24 2130 16 Fr. 1 day              Airway  Duration                  Airway - Non-Surgical 02/04/24 2200 1 day              Arterial Line  Duration             Arterial Line 02/04/24 2206 Right Radial 1 day              Peripheral Intravenous Line  Duration                  Peripheral IV - Double Lumen 02/02/24 2300 Distal;Left;Posterior Forearm 3 days         Peripheral IV - Double Lumen 02/02/24 2300 Posterior;Right Wrist 3 days         Peripheral IV - Single Lumen 02/04/24 2300 18 G Left;Anterior Foot 1 day                    Plan:  wnl     Restraints  Face to face evaluation of need for restraints on rounds today:   Currently restrained? No.        Disposition  Unchanged. Continue ongoing ICU level care.  Right subdural - to OR emergently completely  Right intraparenchymal hemmorhage - monitor for expansion  Respiratory fialure - trying to extubate but still going apenic         Wilson Chadwick MD     30 minutes of critical care was spent on this patient personally by me on the following activities: development  of treatment plan with patient and bedside nurse, discussions with consultants, evaluation of patient's response to treatment, examining the patient, ordering and preforming treatments and interventions, ordering and reviewing laboratory studies, ordering and reviewing radiologic studies, and re-evaluation of patient's condition.

## 2024-02-06 NOTE — NURSING
Nurses Note -- 4 Eyes      2/6/2024   4:46 AM      Skin assessed during: Q Shift Change      [] No Altered Skin Integrity Present    [x]Prevention Measures Documented      [x] Yes- Altered Skin Integrity Present or Discovered   [] LDA Added if Not in Epic (Describe Wound)   [] New Altered Skin Integrity was Present on Admit and Documented in LDA   [] Wound Image Taken    Wound Care Consulted? No    Attending Nurse:  Anton Alvarez RN/Staff Member:  lula fontanez

## 2024-02-06 NOTE — PLAN OF CARE
Problem: SLP  Goal: SLP Goal  Description: LTG:  Pt will tolerate the least restrictive PO diet with no clinical signs/sx of aspiration.    STGs:  1.  Ice chips, no signs/sx of aspiration  2.  Pureed solids, no signs/sx of aspiration  3.  MBS as indicated  Outcome: Ongoing, Progressing     POC initiated and goals created.  Brooklynn

## 2024-02-06 NOTE — PT/OT/SLP RE-EVAL
Occupational Therapy   Re-evaluation    Name: Rico Briggs  MRN: [id number]    Recommendations:     Discharge therapy intensity: High Intensity Therapy   Discharge Equipment Recommendations:  to be determined by next level of care  Barriers to discharge:   (ongoing medical needs; severity of deficits)    Assessment:     Rico Briggs is a 68 y.o. male with a medical diagnosis of fall resulting in SDH s/p craniotomy for evacuation. Noted to have a cognitive change, CT showed worsening SDH. Now s/p craniectomy. He presents with the following performance deficits affecting function: weakness, impaired endurance, impaired self care skills, impaired functional mobility, gait instability, impaired balance, visual deficits, decreased coordination, decreased upper extremity function. Pt following 100% of commands, however delayed. Pt with noted coordination deficits in BUEs; weaker in LUE though functionally within limits. Noted to have left eye nystagmus, right eye with swelling therefore will need to continue to assess for visual deficits; noted to localize appropriately left and right when scanning environment during ambulation. Will currently benefit from high intensity therapy upon d/c.    Rehab Prognosis: Good; patient would benefit from acute skilled OT services to address these deficits and reach maximum level of function.       Plan:     Patient to be seen 5 x/week to address the above listed problems via self-care/home management, therapeutic activities, therapeutic exercises  Plan of Care Expires: 03/06/24  Plan of Care Reviewed with: patient    Subjective     Chief Complaint: none stated  Patient/Family Comments/goals: to get better    Pain/Comfort:  Pain Rating 1: 0/10    Patients cultural, spiritual, Spiritism conflicts given the current situation: no    Objective:     OT communicated with NSG prior to session.      Patient was found HOB elevated with blood pressure cuff, hemovac, bartholomew catheter, oxygen,  peripheral IV, pulse ox (continuous), telemetry upon OT entry to room.    General Precautions: Standard, fall (BP < 140/90)  Orthopedic Precautions: N/A  Braces:  (helmet)  Nasal Cannula 2L O2  Vital Signs: 132/74 100% 77 bpm    Bed Mobility:    Patient completed Supine to Sit with moderate assistance    Functional Mobility/Transfers:  Patient completed Sit <> Stand Transfer with minimum assistance  with  rolling walker   Functional Mobility: ambulating around room and in ICU unit with min A using RW; decreased step length noted bilaterally, L>R    Functional Cognition:  Orientation: oriented to Person, Place, Time, and Situation  Command Following: Follows simple one-step commands with 100% accuracy and increased time required    Visual Perceptual Skills:  left eye nystagmus, right eye with swelling therefore will need to continue to assess for visual deficits; noted to localize appropriately left and right when scanning environment during ambulation    Upper Extremity Function:  Right Upper Extremity:   Range of Motion: WFL  Strength: WFL  Coordination: Impaired. Finger to nose, dysmetric  Sensation: WFL    Left Upper Extremity:  Range of Motion: WFL  Strength: grossly 4-/5  Coordination: Impaired. Finger to nose, dysmetric  Sensation: WFL    Therapeutic Positioning  Risk for acquired pressure injuries is increased due to relative decrease in mobility d/t hospitalization  and impaired mobility.    OT interventions performed during the course of today's session in an effort to prevent and/or reduce acquired pressure injuries:   Education was provided on benefits of and recommendations for therapeutic positioning  Therapeutic positioning was provided at the conclusion of session to offload all bony prominences for the prevention and/or reduction of pressure injuries    Skin assessment: full body skin assessment was performed    Findings: known area of altered skin integrity at incision site    OT recommendations for  therapeutic positioning throughout hospitalization:   Follow [facility] Pressure Injury Prevention Protocol  Geomat recommended for sacral protection while UIC      Patient Education:  Patient provided with verbal education education regarding OT role/goals/POC, post op precautions, fall prevention, safety awareness, Discharge/DME recommendations, and pressure ulcer prevention.  Understanding was verbalized, however additional teaching warranted.     Patient left up in chair with all lines intact, call button in reach, chair alarm on, and NSG notified    GOALS:   Multidisciplinary Problems       Occupational Therapy Goals          Problem: Occupational Therapy    Goal Priority Disciplines Outcome Interventions   Occupational Therapy Goal     OT, PT/OT Ongoing, Progressing    Description: LTG: Pt will perform basic ADLs and ADL transfers with Modified independence using LRAD by discharge.    STG: to be met by 3/1    Pt will complete feeding with set up assist.  Pt will complete grooming standing at sink with LRAD with SBA.  Pt will complete UB dressing with SBA.  Pt will complete LB dressing with SBA using LRAD.  Pt will complete toileting with SBA using LRAD.  Pt will complete functional mobility to/from toilet and toilet transfer with SBA using LRAD.                        History:     History reviewed. No pertinent past medical history.      Past Surgical History:   Procedure Laterality Date    CRANIECTOMY Right 2/4/2024    Procedure: CRANIECTOMY;  Surgeon: Lauri Farrell MD;  Location: [facility];  Service: Neurosurgery;  Laterality: Right;    CRANIOTOMY FOR EVACUATION OF SUBDURAL HEMATOMA Right 2/3/2024    Procedure: CRANIOTOMY, FOR SUBDURAL HEMATOMA EVACUATION;  Surgeon: Lauri Farrell MD;  Location: [facility] OR;  Service: Neurosurgery;  Laterality: Right;       Time Tracking:     OT Date of Treatment:    OT Start Time: 0832  OT Stop Time: 0912  OT Total Time (min): 40 min    Billable Minutes:Re-eval 1  Self  Care/Home Management 1    2/6/2024

## 2024-02-06 NOTE — PLAN OF CARE
Problem: Physical Therapy  Goal: Physical Therapy Goal  Description: Goals to be met by: d/c     Patient will increase functional independence with mobility by performin. Supine to sit with Stand-by Assistance  2. Sit to supine with Stand-by Assistance  3. Sit to stand transfer with Stand-by Assistance  4. Gait  x 200 feet with Stand-by Assistance using Least Restrictive Assistive Device.   5. Ascend/descend 2 stair without Handrails Minimal Assistance using Least Restrictive Assistive Device.     Outcome: Ongoing, Progressing

## 2024-02-06 NOTE — PT/OT/SLP EVAL
"[facility]  Speech Language Pathology Department  Clinical Swallow Evaluation    Patient Name:  Rico Briggs   MRN:  [id number]    Recommendations:     General recommendations:  dysphagia therapy  Diet texture/consistency recommendations:  NPO  Medications: NPO  Precautions: Standard, aspiration    History:     Rico Briggs is a/n 68 y.o. male admitted s/p fall resulting in SDH s/p craniotomy for evacuation. Noted to have a cognitive change, CT showed worsening SDH. Now s/p craniectomy.     History reviewed. No pertinent past medical history.  Past Surgical History:   Procedure Laterality Date    CRANIECTOMY Right 2/4/2024    Procedure: CRANIECTOMY;  Surgeon: Lauri Farrell MD;  Location: [facility];  Service: Neurosurgery;  Laterality: Right;    CRANIOTOMY FOR EVACUATION OF SUBDURAL HEMATOMA Right 2/3/2024    Procedure: CRANIOTOMY, FOR SUBDURAL HEMATOMA EVACUATION;  Surgeon: Lauri Farrell MD;  Location: [facility];  Service: Neurosurgery;  Laterality: Right;     Prior Intubation HX:  at outlying facility prior to transfer.  Pt extubated however required reintubation.  S/p extubation 2/6/24.    Home diet texture/consistency: Regular and thin liquids  Current method of nutrition: NPO    Patient complaint: "I want ice"    Imaging   Results for orders placed during the hospital encounter of 02/02/24    X-Ray Chest 1 View    Narrative  EXAMINATION:  XR CHEST 1 VIEW    CPT 39372    CLINICAL HISTORY:  ET placement;    COMPARISON:  February 4, 2024    FINDINGS:  Examination reveals cardiomediastinal silhouette improved parenchymal changes to be essentially unchanged as compared with the previous exam.    Interval insertion of an endotracheal tube with the tip above the [place]    Impression  No significant change.    Interval insertion of endotracheal tube      Electronically signed by: Ronny Mendoza  Date:    02/05/2024  Time:    07:08    No results found for this or any " previous visit.    No results found for this or any previous visit.    Subjective     Patient awake, alert, and flat.    Patient goals: to eat drink     Spiritual/Cultural/Islam Beliefs/Practices that affect care: no  Pain/Comfort: Pain Rating 1: 0/10    Respiratory status: nasal cannula    Objective:     ORAL MUSCULATURE  Secretion Management: adequate  Mucosal Quality: good  Facial Movement: WFL  Buccal Strength & Mobility: WFL  Mandibular Strength & Mobility: WFL  Oral Labial Strength & Mobility: WFL  Lingual Strength & Mobility: WFL  Velar Elevation: WFL  Vocal Quality: hoarse  Volitional Cough: Productive  Volitional Swallow: present    Consistency Fed By Oral Symptoms Pharyngeal Symptoms   Ice chips Self Bolus holding Throat clear after swallow   Puree Self Bolus holding Multiple swallows  Throat clear after swallow     Assessment:     Pt presents with signs/sx of aspiration noted with all trials given.  Increased difficulty noted as trials progressed.  Rec: NPO.    Goals:     Multidisciplinary Problems       SLP Goals          Problem: SLP    Goal Priority Disciplines Outcome   SLP Goal     SLP Ongoing, Progressing   Description: LTG:  Pt will tolerate the least restrictive PO diet with no clinical signs/sx of aspiration.    STGs:  1.  Ice chips, no signs/sx of aspiration  2.  Pureed solids, no signs/sx of aspiration  3.  MBS as indicated                     Patient Education:     Patient, spouse were, and daughter/s were provided with verbal education regarding recommendations.  Understanding was verbalized.    Plan:     SLP Follow-Up:  Yes   Patient to be seen:  daily   Plan of Care expires:  02/13/24  Plan of Care reviewed with:  patient, spouse      Time Tracking:     SLP Treatment Date:   02/06/24  Speech Start Time:  1240  Speech Stop Time:  1255     Speech Total Time (min):  15 min    Billable minutes:  Swallow and Oral Function Evaluation, 15 minutes     02/06/2024

## 2024-02-06 NOTE — PT/OT/SLP EVAL
Physical Therapy Evaluation    Patient Name:  Rico Briggs   MRN:  [id number]    Recommendations:     Discharge therapy intensity: High Intensity Therapy   Discharge Equipment Recommendations:  (TBD)   Barriers to discharge: Decreased caregiver support, Impaired mobility, and Ongoing medical needs    Assessment:     Rico Briggs is a 68 y.o. male admitted with a medical diagnosis of SDH s/p craniotomy for evacuation. Noted to have a cognitive change, CT showed worsening SDH. Now s/p craniectomy.  He presents with the following impairments/functional limitations: weakness, impaired functional mobility, decreased safety awareness, impaired coordination, impaired endurance, gait instability, impaired balance, decreased lower extremity function. Pt tolerated session well, motivated to participate in therapy. Pt with noted (L)UE/LLE deficits including: weakness and impaired coordination. Sensation intact BLE. Pt with nystagmus during tracking to (L). Pt with delayed command following; however, able to follow 100% commands appropriate. Pt oriented x 4. Pt required ModA x 2 for bed mobility, but once seated EOB able to progress to Johnnie. Pt ambulated 80' with RW Johnnie prior to seated rest, pt with (L) lean during ambulation, short shuffling gait pattern, partial step to with decreased foot clearance.     Rehab Prognosis: Good; patient would benefit from acute skilled PT services to address these deficits and reach maximum level of function.    Recent Surgery: Procedure(s) (LRB):  CRANIECTOMY (Right) 2 Days Post-Op    Plan:     During this hospitalization, patient to be seen 6 x/week to address the identified rehab impairments via gait training, therapeutic activities, therapeutic exercises and progress toward the following goals:    Plan of Care Expires:  03/09/24    Subjective     Chief Complaint: none noted at this time  Patient/Family Comments/goals: to return to PLOF  Pain/Comfort:  Pain Rating 1: 0/10    Patients  cultural, spiritual, Jewish conflicts given the current situation:      Living Environment:  Pt lives with spouse in a 2 story home, bedroom on first floor, 2 steps to enter home without railings. Pt unsure how he fell, stated he thinks he passed out.   Prior to admission, patients level of function was I.  Equipment used at home: none.  DME owned (not currently used): none.  Upon discharge, patient will have assistance from TBD.    Objective:     Communicated with RN prior to session.  Patient found HOB elevated with peripheral IV, pulse ox (continuous), SCD, telemetry, blood pressure cuff, hemovac, bartholomew catheter, oxygen  upon PT entry to room.    General Precautions: Standard, fall, aspiration (<140/90)  Orthopedic Precautions:N/A   Braces:  (helmet)  Respiratory Status: Nasal cannula, flow 2 L/min  Blood Pressure: 131/72, 144/65 post ambulation       Exams:  Fine Motor Coordination:    -       Impaired  LLE heel shin    Sensation:    -       Intact  RLE Strength: WFL  LLE Strength: Deficits: 4- hip flexion, 4- knee extension, 4- DF  Skin integrity:  craniectomy covered with turban dressing      Functional Mobility:  Bed Mobility:     Supine to Sit: moderate assistance and of 2 persons  Transfers:     Sit to Stand:  minimum assistance with rolling walker  Bed to Chair: minimum assistance with  rolling walker  using  Step Transfer  Gait: Pt ambulated 80' with RW minimum assistance,  close chair follow for safety. Pt with (L) lateral lean, decreased L step length with partial step to gait. Decreased foot clearance. Slow gait pattern.       AM-PAC 6 CLICK MOBILITY  Total Score:16       Treatment & Education:    Patient provided with verbal education education regarding PT role/goals/POC, fall prevention, and safety awareness.  Understanding was verbalized, however additional teaching warranted.     Patient left up in chair with all lines intact, call button in reach, and chair alarm on.    GOALS:    Multidisciplinary Problems       Physical Therapy Goals          Problem: Physical Therapy    Goal Priority Disciplines Outcome Goal Variances Interventions   Physical Therapy Goal     PT, PT/OT Ongoing, Progressing     Description: Goals to be met by: d/c     Patient will increase functional independence with mobility by performin. Supine to sit with Stand-by Assistance  2. Sit to supine with Stand-by Assistance  3. Sit to stand transfer with Stand-by Assistance  4. Gait  x 200 feet with Stand-by Assistance using Least Restrictive Assistive Device.   5. Ascend/descend 2 stair without Handrails Minimal Assistance using Least Restrictive Assistive Device.                          History:     History reviewed. No pertinent past medical history.    Past Surgical History:   Procedure Laterality Date    CRANIECTOMY Right 2024    Procedure: CRANIECTOMY;  Surgeon: Lauri Farrell MD;  Location: [facility];  Service: Neurosurgery;  Laterality: Right;    CRANIOTOMY FOR EVACUATION OF SUBDURAL HEMATOMA Right 2/3/2024    Procedure: CRANIOTOMY, FOR SUBDURAL HEMATOMA EVACUATION;  Surgeon: Lauri Farrell MD;  Location: [facility];  Service: Neurosurgery;  Laterality: Right;       Time Tracking:     PT Received On: 24  PT Start Time: 831     PT Stop Time: 910  PT Total Time (min): 39 min     Billable Minutes: Evaluation 30 and Gait Training 9      2024

## 2024-02-07 LAB
ALBUMIN SERPL-MCNC: 3 G/DL (ref 3.4–4.8)
ALBUMIN/GLOB SERPL: 1.2 RATIO (ref 1.1–2)
ALP SERPL-CCNC: 44 UNIT/L (ref 40–150)
ALT SERPL-CCNC: 11 UNIT/L (ref 0–55)
AST SERPL-CCNC: 13 UNIT/L (ref 5–34)
BACTERIA SPEC ANAEROBE CULT: NORMAL
BASOPHILS # BLD AUTO: 0.07 X10(3)/MCL
BASOPHILS NFR BLD AUTO: 0.8 %
BILIRUB SERPL-MCNC: 1 MG/DL
BUN SERPL-MCNC: 14.6 MG/DL (ref 8.4–25.7)
CALCIUM SERPL-MCNC: 8.3 MG/DL (ref 8.8–10)
CHLORIDE SERPL-SCNC: 109 MMOL/L (ref 98–107)
CO2 SERPL-SCNC: 23 MMOL/L (ref 23–31)
CREAT SERPL-MCNC: 0.71 MG/DL (ref 0.73–1.18)
EOSINOPHIL # BLD AUTO: 0.42 X10(3)/MCL (ref 0–0.9)
EOSINOPHIL NFR BLD AUTO: 4.7 %
ERYTHROCYTE [DISTWIDTH] IN BLOOD BY AUTOMATED COUNT: 13.9 % (ref 11.5–17)
GFR SERPLBLD CREATININE-BSD FMLA CKD-EPI: >60 MLS/MIN/1.73/M2
GLOBULIN SER-MCNC: 2.5 GM/DL (ref 2.4–3.5)
GLUCOSE SERPL-MCNC: 81 MG/DL (ref 82–115)
HCT VFR BLD AUTO: 31.8 % (ref 42–52)
HGB BLD-MCNC: 10.6 G/DL (ref 14–18)
IMM GRANULOCYTES # BLD AUTO: 0.03 X10(3)/MCL (ref 0–0.04)
IMM GRANULOCYTES NFR BLD AUTO: 0.3 %
LYMPHOCYTES # BLD AUTO: 1.48 X10(3)/MCL (ref 0.6–4.6)
LYMPHOCYTES NFR BLD AUTO: 16.6 %
MAGNESIUM SERPL-MCNC: 2.4 MG/DL (ref 1.6–2.6)
MCH RBC QN AUTO: 31.9 PG (ref 27–31)
MCHC RBC AUTO-ENTMCNC: 33.3 G/DL (ref 33–36)
MCV RBC AUTO: 95.8 FL (ref 80–94)
MONOCYTES # BLD AUTO: 0.96 X10(3)/MCL (ref 0.1–1.3)
MONOCYTES NFR BLD AUTO: 10.7 %
NEUTROPHILS # BLD AUTO: 5.98 X10(3)/MCL (ref 2.1–9.2)
NEUTROPHILS NFR BLD AUTO: 66.9 %
NRBC BLD AUTO-RTO: 0 %
PHOSPHATE SERPL-MCNC: 3.2 MG/DL (ref 2.3–4.7)
PLATELET # BLD AUTO: 182 X10(3)/MCL (ref 130–400)
PMV BLD AUTO: 12.1 FL (ref 7.4–10.4)
POTASSIUM SERPL-SCNC: 3.7 MMOL/L (ref 3.5–5.1)
PROT SERPL-MCNC: 5.5 GM/DL (ref 5.8–7.6)
RBC # BLD AUTO: 3.32 X10(6)/MCL (ref 4.7–6.1)
SODIUM SERPL-SCNC: 141 MMOL/L (ref 136–145)
WBC # SPEC AUTO: 8.94 X10(3)/MCL (ref 4.5–11.5)

## 2024-02-07 PROCEDURE — 99024 POSTOP FOLLOW-UP VISIT: CPT | Mod: ,,, | Performed by: STUDENT IN AN ORGANIZED HEALTH CARE EDUCATION/TRAINING PROGRAM

## 2024-02-07 PROCEDURE — 25000003 PHARM REV CODE 250: Performed by: SURGERY

## 2024-02-07 PROCEDURE — 97116 GAIT TRAINING THERAPY: CPT

## 2024-02-07 PROCEDURE — 97535 SELF CARE MNGMENT TRAINING: CPT

## 2024-02-07 PROCEDURE — 85025 COMPLETE CBC W/AUTO DIFF WBC: CPT

## 2024-02-07 PROCEDURE — A9698 NON-RAD CONTRAST MATERIALNOC: HCPCS | Performed by: SURGERY

## 2024-02-07 PROCEDURE — 84100 ASSAY OF PHOSPHORUS: CPT | Performed by: NURSE PRACTITIONER

## 2024-02-07 PROCEDURE — 63600175 PHARM REV CODE 636 W HCPCS

## 2024-02-07 PROCEDURE — 63600175 PHARM REV CODE 636 W HCPCS: Performed by: SURGERY

## 2024-02-07 PROCEDURE — 92611 MOTION FLUOROSCOPY/SWALLOW: CPT

## 2024-02-07 PROCEDURE — 83735 ASSAY OF MAGNESIUM: CPT | Performed by: NURSE PRACTITIONER

## 2024-02-07 PROCEDURE — 80053 COMPREHEN METABOLIC PANEL: CPT

## 2024-02-07 PROCEDURE — 25000003 PHARM REV CODE 250

## 2024-02-07 PROCEDURE — 20800000 HC ICU TRAUMA

## 2024-02-07 PROCEDURE — 25500020 PHARM REV CODE 255: Performed by: SURGERY

## 2024-02-07 PROCEDURE — 25000003 PHARM REV CODE 250: Performed by: NURSE PRACTITIONER

## 2024-02-07 RX ADMIN — ALLOPURINOL 300 MG: 100 TABLET ORAL at 08:02

## 2024-02-07 RX ADMIN — FUROSEMIDE 40 MG: 40 TABLET ORAL at 08:02

## 2024-02-07 RX ADMIN — CARVEDILOL 3.12 MG: 3.12 TABLET, FILM COATED ORAL at 08:02

## 2024-02-07 RX ADMIN — SODIUM CHLORIDE: 9 INJECTION, SOLUTION INTRAVENOUS at 01:02

## 2024-02-07 RX ADMIN — BARIUM SULFATE 20 ML: 0.81 POWDER, FOR SUSPENSION ORAL at 01:02

## 2024-02-07 RX ADMIN — FAMOTIDINE 20 MG: 10 INJECTION, SOLUTION INTRAVENOUS at 08:02

## 2024-02-07 RX ADMIN — DOCUSATE SODIUM 100 MG: 100 CAPSULE, LIQUID FILLED ORAL at 08:02

## 2024-02-07 RX ADMIN — POLYETHYLENE GLYCOL 3350 17 G: 17 POWDER, FOR SOLUTION ORAL at 08:02

## 2024-02-07 RX ADMIN — MUPIROCIN: 20 OINTMENT TOPICAL at 08:02

## 2024-02-07 RX ADMIN — LEVETIRACETAM 500 MG: 100 INJECTION, SOLUTION INTRAVENOUS at 09:02

## 2024-02-07 RX ADMIN — LEVETIRACETAM 500 MG: 100 INJECTION, SOLUTION INTRAVENOUS at 08:02

## 2024-02-07 RX ADMIN — SPIRONOLACTONE 25 MG: 25 TABLET ORAL at 08:02

## 2024-02-07 RX ADMIN — HYDRALAZINE HYDROCHLORIDE 10 MG: 20 INJECTION INTRAMUSCULAR; INTRAVENOUS at 05:02

## 2024-02-07 NOTE — PROGRESS NOTES
TR[facility]MA ICU PROGRESS NOTE    HD# 3  Admission Summary:   In brief, Rico Briggs is a 68 y.o. male admitted on 2/2/2024 following unwitnessed fall at some point yesterday resulting in a subdural hematoma, posterior head laceration repair with staples at outside hospital.  Upon arrival her transfer patient was alert, but over the course monitoring the patient he had a worsening change in neuro status, neurosurgery was consulted again after repeat CT head showed worsening of subdural hematoma.  Past medical history of this patient has unknown other than a pacemaker in place. .     Interval history:    Awake and extubated doing well.    Consults:   Neurosurgery Injuries:  Subdural hematoma    []Problems list reviewed Operations/Procedures:  Craniotomy  craniectomy     Past medical history:  Hypercholesterolemia  Cad  Hypertension  gout    Medications: [] Medications reviewed/updated   Home Meds:    Current Outpatient Medications   Medication Instructions    allopurinoL (ZYLOPRIM) 300 mg, Oral, Daily    aspirin 81 mg, Oral, Daily    atorvastatin (LIPITOR) 10 mg, Oral, Daily    carvediloL (COREG) 3.125 mg, Oral, 2 times daily with meals    furosemide (LASIX) 40 mg, Oral, 2 times daily    ramipriL (ALTACE) 2.5 mg, Oral, Daily    spironolactone (ALDACTONE) 25 mg, Oral, Daily      Scheduled Meds:    [START ON 2/7/2024] allopurinoL  300 mg Oral Daily    calcium gluconate IVPB  1 g Intravenous Q1H    carvediloL  3.125 mg Oral BID    docusate sodium  100 mg Oral BID    famotidine (PF)  20 mg Intravenous BID    furosemide  40 mg Oral BID    levETIRAcetam (Keppra) IV (PEDS and ADULTS)  500 mg Intravenous Q12H    mupirocin   Nasal BID    polyethylene glycol  17 g Oral BID    [START ON 2/7/2024] spironolactone  25 mg Oral Daily     Continuous Infusions:    sodium chloride 0.9% 100 mL/hr at 02/06/24 1929     PRN Meds: 0.9%  NaCl infusion (for blood administration), acetaminophen, bisacodyL, fentaNYL, hydrALAZINE, labetalol,  melatonin, oxyCODONE     Vitals:  VITAL SIGNS: 24 HR MIN & MAX LAST   Temp  Min: 98.2 °F (36.8 °C)  Max: 99.6 °F (37.6 °C)  99.6 °F (37.6 °C)   BP  Min: 101/57  Max: 155/75  124/66    Pulse  Min: 53  Max: 82  (!) 56    Resp  Min: 12  Max: 29  19    SpO2  Min: 94 %  Max: 100 %  95 %      HT: 6' (182.9 cm)  WT: 103 kg (227 lb 1.2 oz)  BMI: 30.8   Ideal Body Weight (IBW), Male: 178 lb  % Ideal Body Weight, Male (lb): 127.57 %        General  Exam: intubated and sedated     Neuro/Psych  GCS: 10T (E 3) (V 1) (M 6)  Exam: moves all extremities follows colmmands  ICP monitor: No  ICP treatment: ICP Treatment: N/A  C-Collar: No    Plan:   wnl     HEENT  Exam: perrl    Plan:   Crani done otherwise drain in p[lace     Pulmonary  Vitals: Resp  Av.7  Min: 12  Max: 29  SpO2  Av.1 %  Min: 94 %  Max: 100 %    Ventilator/Oxygen Settings:   Vent Mode: CPAP / PSV  Vt Set: 500 mL  Set Rate: 24 BPM  Pressure Support: 10 cmH20  I:E Ratio Measured: 1:3.2 Vent Mode: CPAP / PSV (24)  Ventilator Initiated: Yes (24)  Set Rate: 24 BPM (24)  Vt Set: 500 mL (24)  Pressure Support: 10 cmH20 (24)  PEEP/CPAP: 5 cmH20 (24)  Oxygen Concentration (%): 30 (24)  Peak Airway Pressure: 16 cmH20 (24)  Total Ve: 7.9 L/m (24)  F/VT Ratio<105 (RSBI): (!) 17.94 (24)      PaO2/FiO2 ratio (if ventilated):   RSBI RR/TV (if ventilated):      ABG:   Recent Labs   Lab 02/06/24  0518   PH 7.510*   PO2 135.0*   PCO2 32.0*   HCO3 25.5          CXR:    No results found in the last 24 hours.      Rib fractures: none  Chest Tube: None     Exam: cta b Peep 8 tv 500     Plan:     On vent   Incentive Spirometry/RT Treatments:      Cardiovascular  Vitals: Pulse  Av.6  Min: 53  Max: 82  BP  Min: 101/57  Max: 155/75  Recent Labs   Lab 24  0033 24  0655 24  0241   TROPONINI 0.552* 0.335*  --    BNP  --   --  266.8*       Vasoactive  "Agents: None  Exam: rrr    Plan:   wnl     Renal  Recent Labs     24  0241   BUN 10.1 11.7 13.6   CREATININE 0.65* 0.69* 0.72*         No results for input(s): "LACTIC" in the last 72 hours.    Intake/Output - Last 3 Shifts             I.V. (mL/kg) 538.1 (5.2) 591.4 (5.7)     Blood 400      IV Piggyback 1647.4 538.6 180    Total Intake(mL/kg) 2585.5 (25.1) 1130 (11) 180 (1.7)    Urine (mL/kg/hr) 1375 (0.6) 1725 (0.7) 1875 (1.3)    Drains 50 175 55    Stool 0 0 0    Total Output 1425 1900 1930    Net +1160.5 -770 -1750           Stool Occurrence 0 x 0 x 0 x             Intake/Output Summary (Last 24 hours) at 2024  Last data filed at 2024  Gross per 24 hour   Intake 1310.04 ml   Output 2780 ml   Net -1469.96 ml           Bay: No     DAILY Risk Assessment:   DAILY Risk Factors: Congestive heart failure                     2  Hypertension                                      2  Nephrotoxic exposure                        3  Mechanical ventilation                        2  *Minimum score 0; Maximum score 21*  Total score: 9    Plan:   wnl     FEN/GI  Recent Labs     24  0241    138 141   K 3.6 3.8 3.7   CO2 22* 23 23   CALCIUM 8.3* 8.2* 8.1*   MG  --  2.40  --    PHOS  --  2.0*  --    ALBUMIN 3.4 3.2* 3.0*   BILITOT 1.4 1.5 0.8   AST 23 17 12   ALKPHOS 41 47 42   ALT 27 20 13         Diet: NPO    Last BM: unknown    Abdominal Exam: soft nt nd    Plan:   wnl     Heme/Onc  Recent Labs     24  0241   HGB 12.7* 12.2* 10.8* 10.3*   HCT 38.1* 36.9* 31.8* 30.9*    147 157 156   INR  --  1.2  --   --          Transfusions (over past 24h): None    Plan:   wnl     ID  Temp  Av.9 °F (37.2 °C)  Min: 98.2 °F (36.8 °C)  Max: 99.6 °F (37.6 °C)      Recent Labs     24  0206 24  0123 " 02/06/24  0241   WBC 16.38* 13.49* 11.96* 10.30         Cultures: Antibiotics:     1.      Plan:   wnl     Endocrine  Recent Labs     02/04/24  0206 02/05/24  0123 02/06/24  0241   GLUCOSE 126* 143* 92        Recent Labs     02/04/24  1827   POCTGLUCOSE 86          Plan:   Glucose normal  Insulin treatment: sliding scale as needed     Musculoskeletal/Wounds  Weight bearing status:   RUE: WBAT  LUE: WBAT  RLE: WBAT  LLE: WBAT    [] Tertiary exam performed    Extremity/wound exam:   Plan:   wnl     Precautions  Standard     Prophylaxis  Seizure: Keppra (day 1/7)  DVT: Defer chemoprophylaxis to Neurosurgery   GI: PPI     Lines/drains/airway [] LDA reviewed/updated   Lines/Drains/Airways       Drain  Duration                  Closed/Suction Drain 02/04/24 2300 Tube - 2 Scalp Accordion 1 day         Closed/Suction Drain 02/04/24 2315 Right Scalp Accordion 10 Fr. 1 day              Peripheral Intravenous Line  Duration                  Peripheral IV - Single Lumen 02/06/24 1000 18 G Anterior;Right Forearm <1 day                    Plan:  wnl     Restraints  Face to face evaluation of need for restraints on rounds today:   Currently restrained? No.        Disposition  Unchanged. Continue ongoing ICU level care.  Right subdural - to OR emergently completely  Right intraparenchymal hemmorhage - monitor for expansion  Respiratory fialure - trying to extubate but still going apenic         Wilson Chadwick MD     30 minutes of critical care was spent on this patient personally by me on the following activities: development of treatment plan with patient and bedside nurse, discussions with consultants, evaluation of patient's response to treatment, examining the patient, ordering and preforming treatments and interventions, ordering and reviewing laboratory studies, ordering and reviewing radiologic studies, and re-evaluation of patient's condition.

## 2024-02-07 NOTE — PROCEDURES
[facility]  Speech Language Pathology Department  Modified Barium Swallow (MBS) Study    Patient Name:  Rico Briggs   MRN:  [id number]    Recommendations:     General recommendations:  dysphagia therapy and Speech/Language and Cognitive Evaluation  Repeat MBS study: as needed  Diet texture/consistency recommendations:  Soft & Bite-sized solids (IDDSI 6) and thin liquids (IDDSI 0)  Medications: crushed in puree  Swallow strategies/precautions: NO straws and upright for PO intake  General precautions: Standard, aspiration    History/Reason for Referral:     Rico Briggs is a/n 68 y.o. male admitted s/p fall resulting in SDH s/p craniotomy for evacuation. Noted to have a cognitive change, CT showed worsening SDH. Now s/p craniectomy.    History reviewed. No pertinent past medical history.  Past Surgical History:   Procedure Laterality Date    CRANIECTOMY Right 2/4/2024    Procedure: CRANIECTOMY;  Surgeon: Lauri Farrell MD;  Location: [facility];  Service: Neurosurgery;  Laterality: Right;    CRANIOTOMY FOR EVACUATION OF SUBDURAL HEMATOMA Right 2/3/2024    Procedure: CRANIOTOMY, FOR SUBDURAL HEMATOMA EVACUATION;  Surgeon: Lauri Farrell MD;  Location: [facility];  Service: Neurosurgery;  Laterality: Right;     A MBS Study was completed at the bedside to assess the efficiency of his swallow function, rule out aspiration and make recommendations regarding safe dietary consistencies, effective compensatory strategies, and safe eating environment.     Intubation hx: at outlying facility prior to transfer.  Pt extubated however required reintubation.  S/p extubation 2/6/24.     Home diet texture/consistency: Regular and thin liquids  Current Method of Nutrition: NPO    Patient complaint: to eat/drink    Imaging   Results for orders placed during the hospital encounter of 02/02/24    X-Ray Chest 1 View    Narrative  EXAMINATION:  XR CHEST 1 VIEW    CPT 93543    CLINICAL HISTORY:  ET  placement;    COMPARISON:  February 4, 2024    FINDINGS:  Examination reveals cardiomediastinal silhouette improved parenchymal changes to be essentially unchanged as compared with the previous exam.    Interval insertion of an endotracheal tube with the tip above the [place]    Impression  No significant change.    Interval insertion of endotracheal tube      Electronically signed by: Ronny Mendoza  Date:    02/05/2024  Time:    07:08    No results found for this or any previous visit.    No results found for this or any previous visit.    Subjective:     Patient awake, calm, and flat.    Spiritual/Cultural/Faith Beliefs/Practices that affect care: no  Pain/Comfort: Pain Rating 1: 0/10      Fluoroscopic Results:     Oral Musculature  Secretion Management: adequate  Mucosal Quality: good  Facial Movement: WFL  Buccal Strength & Mobility: WFL  Mandibular Strength & Mobility: WFL  Oral Labial Strength & Mobility: WFL  Lingual Strength & Mobility: WFL  Velar Elevation: WFL  Vocal Quality: adequate  Volitional Cough: Productive      Positioning: upright in bed  Visualization  Patient was seen in the lateral view    Oral Phase:   Prolonged mastication  Loss of bolus control with prespill to the pyriform sinuses    Pharyngeal Phase:   Swallow delay with spill to the pyriform sinuses  Reduced base of tongue retraction  Reduced hyolaryngeal excursion  Consistency Laryngeal Penetration Aspiration Residue   Mildly thick liquid by spoon None None Mild  Valleculae, Pyriform sinus, and Posterior pharyngeal wall   Puree None None Moderate  Valleculae and Pyriform sinus  Reduced with additional swallow   Thin liquid by cup None None Mild  Valleculae and Pyriform sinus   Chewable solid None None Mild  Valleculae and Pyriform sinus   Thin liquid by straw Shallow, during the swallow;  cleared during the swallow None Mild  Valleculae and Pyriform sinus       Cervical Esophageal Phase:   UES appeared to accommodate all bolus  types without stasis or retrograde movement visualized      Additional Comments:  Swallow function negatively impacted by cognitive status and reduced PEDRO    Assessment:     Pt exhibited mild oropharyngeal dysphagia characterized by the findings noted above.  Laryngeal penetration of thin liquids by straw was visualized and cleared during the swallow.  Both swallow safety and swallow efficiency are preserved,    Patient appears to be at low risk for aspiration related pneumonia when considering complicated medical status.  Prognosis for behavioral swallow rehabilitation is good.    Goals:       Multidisciplinary Problems       SLP Goals          Problem: SLP    Goal Priority Disciplines Outcome   SLP Goal     SLP Ongoing, Progressing   Description: LTG:  Pt will tolerate the least restrictive PO diet with no clinical signs/sx of aspiration.    STGs:  1.  Ice chips, no signs/sx of aspiration  2.  Pureed solids, no signs/sx of aspiration  3.  MBS as indicated--completed 2/7/24  4.  Laryngeal elevation exercises and jimena, min assist  5.  Thermal stimulation, 100% swallow response with effortful swallow, less than 2 seconds delay  6.  Thin liquids by straw, 4 oz, no signs/sx of aspiration  7.  Easy to chew solids, half cory, no signs/sx of aspiration                Patient Education:     Patient and spouse were provided with verbal education regarding MBS results.  Understanding was verbalized.    Plan:     SLP Follow-Up:  Yes    Patient to be seen:  5 x/week   Plan of Care expires:  02/21/24  Plan of Care reviewed with:  patient, spouse     Time Tracking:     SLP Treatment Date:   02/07/24  Speech Start Time:  1310  Speech Stop Time:  1330     Speech Total Time (min):  20 min    Billable minutes:   Motion Fluoroscopic Evaluation, Video Recording, 20 minutes     02/07/2024

## 2024-02-07 NOTE — PLAN OF CARE
Problem: SLP  Goal: SLP Goal  Description: LTG:  Pt will tolerate the least restrictive PO diet with no clinical signs/sx of aspiration.    STGs:  1.  Ice chips, no signs/sx of aspiration  2.  Pureed solids, no signs/sx of aspiration  3.  MBS as indicated--completed 2/7/24  4.  Laryngeal elevation exercises and jimena, min assist  5.  Thermal stimulation, 100% swallow response with effortful swallow, less than 2 seconds delay  6.  Thin liquids by straw, 4 oz, no signs/sx of aspiration  7.  Easy to chew solids, half cory, no signs/sx of aspiration  Outcome: Ongoing, Progressing     POC and goals updated.  Brooklynn

## 2024-02-07 NOTE — PT/OT/SLP PROGRESS
Occupational Therapy   Treatment    Name: Rico Briggs  MRN: [id number]  Admitting Diagnosis:  Subdural hematoma  3 Days Post-Op    Recommendations:     Recommended therapy intensity at discharge: High Intensity Therapy   Discharge Equipment Recommendations:  to be determined by next level of care  Barriers to discharge:   (ongoing medical needs; severity of deficits)    Assessment:     Rico Briggs is a 68 y.o. male with a medical diagnosis of SDH s/p craniotomy for evacuation. Noted to have a cognitive change, CT showed worsening SDH. Now s/p craniectomy. Performance deficits affecting function are weakness, impaired endurance, impaired self care skills, impaired functional mobility, gait instability, impaired balance, visual deficits, decreased coordination, decreased upper extremity function.     Rehab Prognosis:  Good; patient would benefit from acute skilled OT services to address these deficits and reach maximum level of function.       Plan:     Patient to be seen 5 x/week to address the above listed problems via self-care/home management, therapeutic activities, therapeutic exercises  Plan of Care Expires: 03/06/24  Plan of Care Reviewed with: patient    Subjective     Pain/Comfort:  Pain Rating 1: 0/10    Objective:     Communicated with: nsg prior to session.  Patient found up in chair with blood pressure cuff, hemovac, bartholomew catheter, oxygen, peripheral IV, pulse ox (continuous), telemetry upon OT entry to room.    General Precautions: Standard, fall (BP < 140/90)    Orthopedic Precautions:N/A  Braces:  (helmet)  Respiratory Status: Room air  Vital Signs: Blood Pressure: 119/68     Occupational Performance:     Functional Mobility/Transfers:  Patient completed Sit <> Stand Transfer with Min A  with  rolling walker ; during one trial, Pt with posterior LOB with Mod A to recover  Functional Mobility: ambulating throughout ICU with Min A, required assist to steer RW; noted to have decreased  environmental awareness to the left, running into two objects  Standing with CGA, noted to rock backwards onto heels unsteadily    Activities of Daily Living:  Grooming: contact guard assistance with use of RW, standing at sink to perform oral hygiene; required assist to open toothbrush/toothpaste container indicating consistent coordination deficits    Therapeutic Positioning    OT interventions performed during the course of today's session in an effort to prevent and/or reduce acquired pressure injuries:   Education was provided on benefits of and recommendations for therapeutic positioning  Therapeutic positioning was provided at the conclusion of session to offload all bony prominences for the prevention and/or reduction of pressure injuries      Patient Education:  Patient and spouse were provided with verbal education education regarding OT role/goals/POC, fall prevention, safety awareness, Discharge/DME recommendations, and pressure ulcer prevention.  Understanding was verbalized, however additional teaching warranted.      Patient left up in chair with all lines intact, call button in reach, NSG notified, and wife present    GOALS:   Multidisciplinary Problems       Occupational Therapy Goals          Problem: Occupational Therapy    Goal Priority Disciplines Outcome Interventions   Occupational Therapy Goal     OT, PT/OT Ongoing, Progressing    Description: LTG: Pt will perform basic ADLs and ADL transfers with Modified independence using LRAD by discharge.    STG: to be met by 3/1    Pt will complete feeding with set up assist.  Pt will complete grooming standing at sink with LRAD with SBA.  Pt will complete UB dressing with SBA.  Pt will complete LB dressing with SBA using LRAD.  Pt will complete toileting with SBA using LRAD.  Pt will complete functional mobility to/from toilet and toilet transfer with SBA using LRAD.                        Time Tracking:     OT Date of Treatment:    OT Start Time:  1017  OT Stop Time: 1040  OT Total Time (min): 23 min    Billable Minutes:Self Care/Home Management 2    OT/SIDNEY: OT     Number of SIDNEY visits since last OT visit: 0    2/7/2024

## 2024-02-07 NOTE — PT/OT/SLP PROGRESS
POC discussed with nursing and pt seen at bedside.  Pt awake and alert, improvement in vocal quality noted.  SLP to proceed with MBS to assess current swallow function.

## 2024-02-07 NOTE — PLAN OF CARE
Spoke with pts wife Lizzette and his daughter Terrell at bedside. Discussed high intensity. Lizzette selects [facility].   Referral sent to Polly at [facility]

## 2024-02-07 NOTE — OP NOTE
NEUROSURGERY OPERATIVE NOTE     DATE OF OPERATION:   2/4/24     PREOPERATIVE DIAGNOSIS:   Severe traumatic brain injury  Right acute subdural hematoma s/p craniotomy for evacuation  Right acute extradural hematoma causing mass effect and midline shift.     POSTOPERATIVE DIAGNOSIS:   Same     SURGEON: Lauri Bangura MD     PROCEDURE:   Right-sided craniectomy for evacuation of acute subdural hematoma.     Findings   Generalized oozing  Extradural hematoma evacuated  Cerebral edema  Bone flap removed and sent to freezer     ANESTHESIA:   General endotracheal      BLOOD LOSS:   250 mL    COMPLICATIONS:   None      DRAINS:   2 medium Hemovacs to full suction     INDICATION   Mr. Briggs is a 68-year-old male who presented on 2/3 as an unknown trauma patient with unknown past medical history after a ground level fall with head strike.  Imaging at the time demonstrated large right acute subdural hematoma.  He was taken emergently to the operating room.  A craniotomy was performed and the hematoma was evacuated.  On postop day 2 he was noted to be more somnolent and CT head demonstrated subgaleal and extradural blood causing mass effect and midline shift.  Family reported prior aspirin use, platelet function assay abnormal.  Return to OR for hematoma evacuation was recommended.  Risks and benefits of the procedure were discussed with his wife and informed consent was obtained.  Platelet transfusion was initiated on anesthesia induction.    PROCEDURE IN DETAIL  After endotracheal intubation and induction of general anesthesia, a roll was placed under the ipsilateral shoulder and the head turned contralaterally and elevated. The head was placed in a horseshoe head bustillo. The prior incision was identified. Staples were removed and the area was prepped and draped in sterile fashion.  A surgical time-out was completed with a were parties in agreement.  The prior subgaleal sutures were cut using Metzenbaum scissors.  Subgaleal  hematoma was immediately evident and evacuated with copious irrigation.  The bone flap and cranial fixation system were then removed and there was evidence of extradural hematoma.  This was also evacuated with a combination of irrigation and suctioned.  There was generalized oozing throughout the subgaleal and epidural space.  Meticulous hemostasis was achieved with a combination of bipolar cautery and Surgiflo.  The patient received transfusion of 2 packs of platelets and the amount of bleeding significantly decreased afterwards.  The prior dura matrix onlay was removed and the subdural space was explored.  There was no evidence of new subdural blood.  The brain was noted to be swollen and the decision was made not to replace the bone flap.  A new large piece of dura matrix onlay was placed over the dura. A layer of Seprafilm was placed under the muscle to help with separation of the muscle off of the pseudo-dura at the time of the cranioplasty.  Two medium Hemovacs were placed in the subgaleal space and brought out through separate stab incision.  The wound was irrigated copiously with antibiotic irrigation. The temporalis fascia was then closed with 3-0 Vicryl in an interrupted fashion. The scalp was closed with 2-0 Vicryl for the galea and staples for the skin edges.  A loose full neurosurgical head dressing was applied.  The patient was then taken to the ICU in critical, but stable condition with correct sponge and needle counts.     Lauri Bangura MD  Neurosurgery  [facility]

## 2024-02-07 NOTE — NURSING
Nurses Note -- 4 Eyes      2/7/2024   4:55 AM      Skin assessed during: Daily Assessment      [x] No Altered Skin Integrity Present    [x]Prevention Measures Documented      [] Yes- Altered Skin Integrity Present or Discovered   [] LDA Added if Not in Epic (Describe Wound)   [] New Altered Skin Integrity was Present on Admit and Documented in LDA   [] Wound Image Taken    Wound Care Consulted? No    Attending Nurse:  LUPILLO Lux    Second RN/Staff Member:  LUPILLO Jin

## 2024-02-07 NOTE — PT/OT/SLP PROGRESS
"Physical Therapy Treatment    Patient Name:  Rico Briggs   MRN:  [id number]    Recommendations:     Discharge therapy intensity: High Intensity Therapy (vs low intensity w/ assistance at home)   Discharge Equipment Recommendations:  (TBD)  Barriers to discharge: Ongoing medical needs    Assessment:     Rico Briggs is a 68 y.o. male admitted with a medical diagnosis of SDH s/p craniotomy for evacuation. Noted to have a cognitive change, CT showed worsening SDH. Now s/p craniectomy.  He presents with the following impairments/functional limitations: weakness, impaired functional mobility, decreased safety awareness, impaired coordination, impaired endurance, gait instability, impaired balance, decreased lower extremity function.    Pt up in chair upon PT arrival to room, had already mobilized with OT however agreed to participate with PT as well. Pt Min A for STS to RW. He ambulated 124' w/ RW and CGA, no unsteadiness noted. Pt returned to chair after ambulation. Pt verbalized frustration about "haven't eaten since Friday," pt educated on role of SLP and avoiding aspiration. If pt has assist at home upon d/c, low intensity therapy would be most appropriate at this time.     Rehab Prognosis: Good; patient would benefit from acute skilled PT services to address these deficits and reach maximum level of function.    Recent Surgery: Procedure(s) (LRB):  CRANIECTOMY (Right) 3 Days Post-Op    Plan:     During this hospitalization, patient to be seen 6 x/week to address the identified rehab impairments via gait training, therapeutic activities, therapeutic exercises and progress toward the following goals:    Plan of Care Expires:  03/09/24    Subjective     Chief Complaint: "haven't eaten since Friday"  Patient/Family Comments/goals: none  Pain/Comfort:  Pain Rating 1: 0/10      Objective:     Communicated with nurse prior to session.  Patient found up in chair with peripheral IV, pulse ox (continuous), SCD, " telemetry, bartholomew catheter, hemovac upon PT entry to room.     General Precautions: Standard, fall, aspiration (BP < 140/90)  Orthopedic Precautions: N/A  Braces:  (helmet)  Respiratory Status: Room air  Blood Pressure: 129/68  Skin Integrity: Visible skin intact      Functional Mobility:  Transfers:     Sit to Stand:  minimum assistance with rolling walker  Gait: 124' w/ RW and CGA    Therapeutic Activities/Exercises:      Education:  Education Provided:  Role and goals of PT, transfer training, bed mobility, gait training, balance training, safety awareness, assistive device, strengthening exercises, and importance of participating in PT to return to PLOF.      Understanding was verbalized.     Patient left up in chair with all lines intact, call button in reach, and visitor present..    GOALS:   Multidisciplinary Problems       Physical Therapy Goals          Problem: Physical Therapy    Goal Priority Disciplines Outcome Goal Variances Interventions   Physical Therapy Goal     PT, PT/OT Ongoing, Progressing     Description: Goals to be met by: d/c     Patient will increase functional independence with mobility by performin. Supine to sit with Stand-by Assistance  2. Sit to supine with Stand-by Assistance  3. Sit to stand transfer with Stand-by Assistance  4. Gait  x 200 feet with Stand-by Assistance using Least Restrictive Assistive Device.   5. Ascend/descend 2 stair without Handrails Minimal Assistance using Least Restrictive Assistive Device.                          Time Tracking:     PT Received On: 24  PT Start Time: 1108     PT Stop Time: 1128  PT Total Time (min): 20 min     Billable Minutes: Gait Training 20    Treatment Type: Treatment  PT/PTA: PT     Number of PTA visits since last PT visit: 2024

## 2024-02-07 NOTE — ANESTHESIA POSTPROCEDURE EVALUATION
Anesthesia Post Evaluation    Patient: Rico Briggs    Procedure(s) Performed: Procedure(s) (LRB):  CRANIECTOMY (Right)    Final Anesthesia Type: general      Patient location during evaluation: ICU  Patient participation: No - Unable to Participate, Intubation  Level of consciousness: obtunded/minimal responses  Post-procedure vital signs: reviewed and stable  Pain management: adequate  Airway patency: patent  RUPESH mitigation strategies: Verification of full reversal of neuromuscular block  PONV status at discharge: No PONV  Anesthetic complications: no      Cardiovascular status: blood pressure returned to baseline and stable  Respiratory status: unassisted and intubated  Hydration status: euvolemic  Follow-up not needed.  Comments: [facility]              Vitals Value Taken Time   /43 02/07/24 0602   Temp 36.9 °C (98.4 °F) 02/07/24 0400   Pulse 67 02/07/24 0632   Resp 18 02/07/24 0632   SpO2 97 % 02/07/24 0632   Vitals shown include unvalidated device data.      No case tracking events are documented in the log.      Pain/Antonio Score: Pain Rating Prior to Med Admin: 5 (2/6/2024 10:52 AM)

## 2024-02-07 NOTE — NURSING
Nurses Note -- 4 Eyes      2/7/2024   1:36 PM      Skin assessed during: Q Shift Change      [x] No Altered Skin Integrity Present    [x]Prevention Measures Documented      [] Yes- Altered Skin Integrity Present or Discovered   [] LDA Added if Not in Epic (Describe Wound)   [] New Altered Skin Integrity was Present on Admit and Documented in LDA   [] Wound Image Taken    Wound Care Consulted? No    Attending Nurse:  Francisco Barnett rn    Second RN/Staff Member:  pilar Connell

## 2024-02-07 NOTE — PROGRESS NOTES
POD#3 right craniectomy for SDH  POD#4 right craniotomy for SDH  He is sitting up in bed, NAD  He is much more alert today  He currently denies HA  No acute events overnight    AFVSS  PERRL, EOMI  Joshua well, no deficits appreciated  Alert, oriented to all spheres. Speech clear. Follows commands briskly in all ext  Incision c/d/I  Drain output: #1: 30, #2: 25    Plan: I did dc drain #2. I sutured the drain site with a 3-0 nylon. He tolerated this procedure without complications.  Continue drain #1 at full suction. Record every shift  OK to leave incision open to air  OK for Q4 hour neuro checks  Continue BP parameters below 140/90  Continue Keppra BID  PT/OT for OOB  ST for diet  SCDs for DVT prophylaxis

## 2024-02-07 NOTE — PLAN OF CARE
Problem: SLP  Goal: SLP Goal  Description: LTG:  Pt will tolerate the least restrictive PO diet with no clinical signs/sx of aspiration.    STGs:  1.  Ice chips, no signs/sx of aspiration  2.  Pureed solids, no signs/sx of aspiration  3.  MBS as indicated--completed 2/7/24  4.  Laryngeal elevation exercises and jimena, min assist  5.  Thermal stimulation, 100% swallow response with effortful swallow, less than 2 seconds delay  Outcome: Ongoing, Progressing     POC and goals updated.  Brooklynn

## 2024-02-08 ENCOUNTER — TELEPHONE (OUTPATIENT)
Dept: NEUROSURGERY | Facility: CLINIC | Age: 69
End: 2024-02-08
Payer: MEDICARE

## 2024-02-08 LAB
ALBUMIN SERPL-MCNC: 3 G/DL (ref 3.4–4.8)
ALBUMIN/GLOB SERPL: 1 RATIO (ref 1.1–2)
ALP SERPL-CCNC: 49 UNIT/L (ref 40–150)
ALT SERPL-CCNC: 14 UNIT/L (ref 0–55)
AST SERPL-CCNC: 14 UNIT/L (ref 5–34)
BASOPHILS # BLD AUTO: 0.07 X10(3)/MCL
BASOPHILS NFR BLD AUTO: 0.7 %
BILIRUB SERPL-MCNC: 1 MG/DL
BUN SERPL-MCNC: 10.9 MG/DL (ref 8.4–25.7)
CALCIUM SERPL-MCNC: 8.5 MG/DL (ref 8.8–10)
CHLORIDE SERPL-SCNC: 108 MMOL/L (ref 98–107)
CO2 SERPL-SCNC: 24 MMOL/L (ref 23–31)
CREAT SERPL-MCNC: 0.7 MG/DL (ref 0.73–1.18)
CRP SERPL-MCNC: 42.2 MG/L
EOSINOPHIL # BLD AUTO: 0.64 X10(3)/MCL (ref 0–0.9)
EOSINOPHIL NFR BLD AUTO: 6.7 %
ERYTHROCYTE [DISTWIDTH] IN BLOOD BY AUTOMATED COUNT: 13.7 % (ref 11.5–17)
GFR SERPLBLD CREATININE-BSD FMLA CKD-EPI: >60 MLS/MIN/1.73/M2
GLOBULIN SER-MCNC: 3.1 GM/DL (ref 2.4–3.5)
GLUCOSE SERPL-MCNC: 95 MG/DL (ref 82–115)
HCT VFR BLD AUTO: 32.7 % (ref 42–52)
HGB BLD-MCNC: 11.2 G/DL (ref 14–18)
IMM GRANULOCYTES # BLD AUTO: 0.08 X10(3)/MCL (ref 0–0.04)
IMM GRANULOCYTES NFR BLD AUTO: 0.8 %
LYMPHOCYTES # BLD AUTO: 1.76 X10(3)/MCL (ref 0.6–4.6)
LYMPHOCYTES NFR BLD AUTO: 18.4 %
MAGNESIUM SERPL-MCNC: 2.3 MG/DL (ref 1.6–2.6)
MCH RBC QN AUTO: 31.5 PG (ref 27–31)
MCHC RBC AUTO-ENTMCNC: 34.3 G/DL (ref 33–36)
MCV RBC AUTO: 92.1 FL (ref 80–94)
MONOCYTES # BLD AUTO: 0.99 X10(3)/MCL (ref 0.1–1.3)
MONOCYTES NFR BLD AUTO: 10.4 %
NEUTROPHILS # BLD AUTO: 6.02 X10(3)/MCL (ref 2.1–9.2)
NEUTROPHILS NFR BLD AUTO: 63 %
NRBC BLD AUTO-RTO: 0 %
PHOSPHATE SERPL-MCNC: 3.2 MG/DL (ref 2.3–4.7)
PLATELET # BLD AUTO: 201 X10(3)/MCL (ref 130–400)
PMV BLD AUTO: 11.4 FL (ref 7.4–10.4)
POTASSIUM SERPL-SCNC: 3.4 MMOL/L (ref 3.5–5.1)
PREALB SERPL-MCNC: 16.9 MG/DL (ref 16–42)
PROT SERPL-MCNC: 6.1 GM/DL (ref 5.8–7.6)
RBC # BLD AUTO: 3.55 X10(6)/MCL (ref 4.7–6.1)
SODIUM SERPL-SCNC: 140 MMOL/L (ref 136–145)
WBC # SPEC AUTO: 9.56 X10(3)/MCL (ref 4.5–11.5)

## 2024-02-08 PROCEDURE — 97116 GAIT TRAINING THERAPY: CPT | Mod: CQ

## 2024-02-08 PROCEDURE — 25000003 PHARM REV CODE 250: Performed by: NURSE PRACTITIONER

## 2024-02-08 PROCEDURE — 97530 THERAPEUTIC ACTIVITIES: CPT | Mod: CQ

## 2024-02-08 PROCEDURE — 63600175 PHARM REV CODE 636 W HCPCS

## 2024-02-08 PROCEDURE — 84134 ASSAY OF PREALBUMIN: CPT

## 2024-02-08 PROCEDURE — 86140 C-REACTIVE PROTEIN: CPT

## 2024-02-08 PROCEDURE — 25000003 PHARM REV CODE 250: Performed by: SURGERY

## 2024-02-08 PROCEDURE — 25000003 PHARM REV CODE 250

## 2024-02-08 PROCEDURE — 63600175 PHARM REV CODE 636 W HCPCS: Performed by: NURSE PRACTITIONER

## 2024-02-08 PROCEDURE — 97530 THERAPEUTIC ACTIVITIES: CPT

## 2024-02-08 PROCEDURE — 84100 ASSAY OF PHOSPHORUS: CPT | Performed by: NURSE PRACTITIONER

## 2024-02-08 PROCEDURE — 99024 POSTOP FOLLOW-UP VISIT: CPT | Mod: ,,, | Performed by: STUDENT IN AN ORGANIZED HEALTH CARE EDUCATION/TRAINING PROGRAM

## 2024-02-08 PROCEDURE — 20800000 HC ICU TRAUMA

## 2024-02-08 PROCEDURE — 85025 COMPLETE CBC W/AUTO DIFF WBC: CPT

## 2024-02-08 PROCEDURE — 83735 ASSAY OF MAGNESIUM: CPT | Performed by: NURSE PRACTITIONER

## 2024-02-08 PROCEDURE — 80053 COMPREHEN METABOLIC PANEL: CPT

## 2024-02-08 RX ORDER — ACETAMINOPHEN 325 MG/1
650 TABLET ORAL EVERY 6 HOURS PRN
Status: DISCONTINUED | OUTPATIENT
Start: 2024-02-08 | End: 2024-02-09 | Stop reason: HOSPADM

## 2024-02-08 RX ORDER — POTASSIUM CHLORIDE 14.9 MG/ML
20 INJECTION INTRAVENOUS ONCE
Status: COMPLETED | OUTPATIENT
Start: 2024-02-08 | End: 2024-02-08

## 2024-02-08 RX ADMIN — POLYETHYLENE GLYCOL 3350 17 G: 17 POWDER, FOR SOLUTION ORAL at 08:02

## 2024-02-08 RX ADMIN — FUROSEMIDE 40 MG: 40 TABLET ORAL at 08:02

## 2024-02-08 RX ADMIN — SPIRONOLACTONE 25 MG: 25 TABLET ORAL at 08:02

## 2024-02-08 RX ADMIN — ACETAMINOPHEN 650 MG: 325 TABLET, FILM COATED ORAL at 03:02

## 2024-02-08 RX ADMIN — ALLOPURINOL 300 MG: 100 TABLET ORAL at 09:02

## 2024-02-08 RX ADMIN — CARVEDILOL 3.12 MG: 3.12 TABLET, FILM COATED ORAL at 08:02

## 2024-02-08 RX ADMIN — DOCUSATE SODIUM 100 MG: 100 CAPSULE, LIQUID FILLED ORAL at 08:02

## 2024-02-08 RX ADMIN — LEVETIRACETAM 500 MG: 100 INJECTION, SOLUTION INTRAVENOUS at 08:02

## 2024-02-08 RX ADMIN — MUPIROCIN: 20 OINTMENT TOPICAL at 09:02

## 2024-02-08 RX ADMIN — LEVETIRACETAM 500 MG: 100 INJECTION, SOLUTION INTRAVENOUS at 09:02

## 2024-02-08 RX ADMIN — POTASSIUM CHLORIDE 20 MEQ: 14.9 INJECTION, SOLUTION INTRAVENOUS at 03:02

## 2024-02-08 NOTE — PT/OT/SLP PROGRESS
[facility]  Speech Language Pathology Department  Diet Tolerance Follow-up    Patient Name:  Rico Briggs   MRN:  [id number]  Admitting Diagnosis: s/p fall resulting in SDH s/p craniotomy for evacuation. Noted to have a cognitive change, CT showed worsening SDH. Now s/p craniectomy.       Recommendations:     General recommendations:  dysphagia therapy and Speech/Language and Cognitive Evaluation  Diet texture/consistency recommendations:  Soft & Bite-sized solids (IDDSI 6) and thin liquids (IDDSI 0)  Medications: crushed in puree  Swallow strategies/precautions: NO straws and upright for PO intake  Precautions: Standard, aspiration    Diet tolerance:     Nursing reports no difficulty regarding diet tolerance.    Plan:     SLP to continue POC.       02/08/2024

## 2024-02-08 NOTE — PT/OT/SLP PROGRESS
Physical Therapy Treatment    Patient Name:  Rico Briggs   MRN:  [id number]    Recommendations:     Discharge therapy intensity: High Intensity Therapy   Discharge Equipment Recommendations:  (TBD)  Barriers to discharge: Ongoing medical needs    Assessment:     Rico Briggs is a 68 y.o. male admitted with a medical diagnosis of SDH s/p craniotomy for evacuation. Noted to have a cognitive change, CT showed worsening SDH. Now s/p craniectomy. .  He presents with the following impairments/functional limitations: weakness, impaired endurance, gait instability, impaired functional mobility, impaired balance .    Rehab Prognosis: Good; patient would benefit from acute skilled PT services to address these deficits and reach maximum level of function.    Recent Surgery: Procedure(s) (LRB):  CRANIECTOMY (Right) 4 Days Post-Op    Plan:     During this hospitalization, patient to be seen 6 x/week to address the identified rehab impairments via gait training, therapeutic activities, therapeutic exercises and progress toward the following goals:    Plan of Care Expires:  03/09/24    Subjective     Chief Complaint: NONE  Patient/Family Comments/goals:   Pain/Comfort:         Objective:     Communicated with RN prior to session.  Patient found up in chair with   upon PT entry to room.     General Precautions: Standard, fall, aspiration (BP < 140/90)  Orthopedic Precautions: N/A  Braces:  (helmet)  Respiratory Status: Room air  Blood Pressure: 130/81 with ax  Skin Integrity: Visible skin intact      Functional Mobility:  Transfers:     Sit to Stand:  contact guard assistance with rolling walker  Gait: Pt amb 200ft with RW cga. Vcs needed to keep AD in close prox and for fwd gaze.    Therapeutic Activities/Exercises:  Performed tandem stance and Toe taps 10x1 with one UE on RW CGA. No major LOB    Education:  Patient provided with verbal education education regarding PT role/goals/POC, post-op precautions, fall prevention, and  safety awareness.  Understanding was verbalized, however additional teaching warranted.     Patient left up in chair with all lines intact, call button in reach, and FAMILY present..    GOALS:   Multidisciplinary Problems       Physical Therapy Goals          Problem: Physical Therapy    Goal Priority Disciplines Outcome Goal Variances Interventions   Physical Therapy Goal     PT, PT/OT Ongoing, Progressing     Description: Goals to be met by: d/c     Patient will increase functional independence with mobility by performin. Supine to sit with Stand-by Assistance  2. Sit to supine with Stand-by Assistance  3. Sit to stand transfer with Stand-by Assistance  4. Gait  x 200 feet with Stand-by Assistance using Least Restrictive Assistive Device.   5. Ascend/descend 2 stair without Handrails Minimal Assistance using Least Restrictive Assistive Device.                          Time Tracking:     PT Received On: 24  PT Start Time: 1156     PT Stop Time: 1219  PT Total Time (min): 23 min     Billable Minutes: Gait Training 23 and Therapeutic Activity 8    Treatment Type: Treatment  PT/PTA: PTA     Number of PTA visits since last PT visit: 2     2024

## 2024-02-08 NOTE — PT/OT/SLP PROGRESS
Occupational Therapy   Treatment    Name: Rico Briggs  MRN: [id number]  Admitting Diagnosis:  Subdural hematoma  4 Days Post-Op    Recommendations:     Recommended therapy intensity at discharge: High Intensity Therapy   Discharge Equipment Recommendations:  to be determined by next level of care  Barriers to discharge:   (ongoing medical needs; severity of deficits)    Assessment:     Rico Briggs is a 68 y.o. male with a medical diagnosis of SDH s/p craniotomy for evacuation, now s/p craniectomy 2/2 worsening SDH. Performance deficits affecting function are weakness, impaired endurance, impaired self care skills, impaired functional mobility, gait instability, impaired balance, visual deficits, decreased coordination, decreased upper extremity function. Pt requesting to ambulate in the unit then declined ADLs 2/2 headache after ambulating. NSG alerted, BP assessed 127/65.    Rehab Prognosis:  Good; patient would benefit from acute skilled OT services to address these deficits and reach maximum level of function.       Plan:     Patient to be seen 5 x/week to address the above listed problems via self-care/home management, therapeutic activities, therapeutic exercises  Plan of Care Expires: 03/06/24  Plan of Care Reviewed with: patient, spouse, daughter    Subjective     Pain/Comfort:  Pain Rating 1: 0/10    Objective:     Communicated with: NSG prior to session.  Patient found HOB elevated with blood pressure cuff, peripheral IV, pulse ox (continuous), telemetry, SCD upon OT entry to room.    General Precautions: Standard, fall (BP < 140/90)    Orthopedic Precautions:N/A  Braces:  (helmet)  Respiratory Status: Room air  Vital Signs: Prior to mobility - 126/67; after mobility 127/65     Occupational Performance:     Bed Mobility:    Patient completed Supine to Sit with minimum assistance     Functional Mobility/Transfers:  Patient completed Sit <> Stand Transfer with moderate assistance  with  rolling walker  from lower surface; min A with RW from higher surface  Functional Mobility: ambulating with CGA, required less cues to visually scan environment    Therapeutic Positioning    OT interventions performed during the course of today's session in an effort to prevent and/or reduce acquired pressure injuries:   Education was provided on benefits of and recommendations for therapeutic positioning  Therapeutic positioning was provided at the conclusion of session to offload all bony prominences for the prevention and/or reduction of pressure injuries    Patient Education:  Patient, spouse were, and daughter/s were provided with verbal education education regarding OT role/goals/POC, fall prevention, safety awareness, Discharge/DME recommendations, and pressure ulcer prevention.  Understanding was verbalized.      Patient left up in chair with all lines intact, call button in reach, NSG notified, and spouse and daughter present    GOALS:   Multidisciplinary Problems       Occupational Therapy Goals          Problem: Occupational Therapy    Goal Priority Disciplines Outcome Interventions   Occupational Therapy Goal     OT, PT/OT Ongoing, Progressing    Description: LTG: Pt will perform basic ADLs and ADL transfers with Modified independence using LRAD by discharge.    STG: to be met by 3/1    Pt will complete feeding with set up assist.  Pt will complete grooming standing at sink with LRAD with SBA.  Pt will complete UB dressing with SBA.  Pt will complete LB dressing with SBA using LRAD.  Pt will complete toileting with SBA using LRAD.  Pt will complete functional mobility to/from toilet and toilet transfer with SBA using LRAD.                        Time Tracking:     OT Date of Treatment:    OT Start Time: 1002  OT Stop Time: 1025  OT Total Time (min): 23 min    Billable Minutes:Therapeutic Activity 2    OT/SIDNEY: OT     Number of SIDNEY visits since last OT visit: 2    2/8/2024

## 2024-02-08 NOTE — NURSING
Nurses Note -- 4 Eyes      2/8/2024   4:35 AM      Skin assessed during: Daily Assessment      [x] No Altered Skin Integrity Present    [x]Prevention Measures Documented      [] Yes- Altered Skin Integrity Present or Discovered   [] LDA Added if Not in Epic (Describe Wound)   [] New Altered Skin Integrity was Present on Admit and Documented in LDA   [] Wound Image Taken    Wound Care Consulted? No    Attending Nurse:  LUPILLO Lux    Second RN/Staff Member:  LUPILLO Jin

## 2024-02-08 NOTE — TELEPHONE ENCOUNTER
Follow-up in Neurosurgery clinic 2 weeks after discharge from rehab with repeat CT head  PER dR. Lind.

## 2024-02-08 NOTE — PROGRESS NOTES
POD#4 right craniectomy for SDH  POD#5 right craniotomy for SDH      Sitting up in bed.    Very responsive and alert today.    Wearing helmet   Preparing for remaining Hemovac to be discontinued.  Vitals are stable.    Neurologically stable   Wound looks clean and dry.    No headache or any other issues overnight.        Neuro:  GCS 15   PERRLA   Moving all extremities with no deficits   Oriented to all spheres speech is clear.      Plan:    Discontinued remaining drain and suture was placed.  No issues.    Continue every 4 hour neurological exams   Leave all incisions open to air and monitor.    Blood pressure less than 140/90   Keppra, seizure precautions  PTOT  Mobilization  SCDs   Fall precautions   Okay to downgrade   Okay to start prophylactic Lovenox tomorrow    Post-Op Info:  Procedure(s) (LRB):  CRANIECTOMY (Right)   4 Days Post-Op      Medications:  Continuous Infusions:   sodium chloride 0.9% Stopped (02/07/24 1252)     Scheduled Meds:   allopurinoL  300 mg Oral Daily    carvediloL  3.125 mg Oral BID    docusate sodium  100 mg Oral BID    furosemide  40 mg Oral BID    levETIRAcetam (Keppra) IV (PEDS and ADULTS)  500 mg Intravenous Q12H    mupirocin   Nasal BID    polyethylene glycol  17 g Oral BID    spironolactone  25 mg Oral Daily     PRN Meds:0.9%  NaCl infusion (for blood administration), acetaminophen, bisacodyL, fentaNYL, hydrALAZINE, labetalol, melatonin, oxyCODONE     Review of Systems  Objective:     Weight: 103 kg (227 lb 1.2 oz)  Body mass index is 30.8 kg/m².  Vital Signs (Most Recent):  Temp: 97.8 °F (36.6 °C) (02/08/24 0800)  Pulse: 68 (02/08/24 0900)  Resp: (!) 21 (02/08/24 0900)  BP: 130/69 (02/08/24 0900)  SpO2: 96 % (02/08/24 0900) Vital Signs (24h Range):  Temp:  [97.3 °F (36.3 °C)-99.6 °F (37.6 °C)] 97.8 °F (36.6 °C)  Pulse:  [54-80] 68  Resp:  [10-28] 21  SpO2:  [93 %-99 %] 96 %  BP: (114-148)/(62-79) 130/69     Date 02/08/24 0700 - 02/09/24 0659   Shift 7696-6218 9484-5807 7650-8750  "24 Hour Total   INTAKE   Shift Total(mL/kg)       OUTPUT   Urine(mL/kg/hr) 450   450   Drains 100   100   Shift Total(mL/kg) 550(5.3)   550(5.3)   Weight (kg) 103 103 103 103                            Neurosurgery Physical Exam    Significant Labs:  Recent Labs   Lab 02/07/24  0033 02/08/24  0156    140   K 3.7 3.4*   CO2 23 24   BUN 14.6 10.9   CREATININE 0.71* 0.70*   CALCIUM 8.3* 8.5*   MG 2.40 2.30     Recent Labs   Lab 02/07/24  0033 02/08/24  0156   WBC 8.94 9.56   HGB 10.6* 11.2*   HCT 31.8* 32.7*    201     No results for input(s): "LABPT", "INR", "APTT" in the last 48 hours.  Microbiology Results (last 7 days)       Procedure Component Value Units Date/Time    Tissue Culture - Aerobic [[id number]] Collected: 02/04/24 2333    Order Status: Completed Specimen: Bone from Skull Updated: 02/08/24 1003     CULTURE, TISSUE- AEROBIC ([facility]) No Growth At 72 Hours    Anaerobic Culture [[id number]] Collected: 02/04/24 2333    Order Status: Completed Specimen: Bone from Skull Updated: 02/07/24 0909     Anaerobe Culture No Anaerobes Isolated    Urine culture [[id number]]  (Abnormal) Collected: 02/03/24 1006    Order Status: Completed Specimen: Urine Updated: 02/05/24 1300     Urine Culture Less than 10,000 colonies/ml Micrococcus luteus     Comment: [place] counts <10,000/ml are of questionable significance and may or may not indicate contamination.  Therefore organisms are identified only.  If further workup is desired please notify Microbiology        AFB Smear [[id number]] Collected: 02/04/24 2333    Order Status: Completed Specimen: Bone from Skull Updated: 02/05/24 0932     AFB Smear No AFB seen (Direct smear only)    Gram Stain [[id number]] Collected: 02/04/24 2333    Order Status: Completed Specimen: Bone from Skull Updated: 02/05/24 0818     GRAM STAIN No WBCs, No bacteria seen    Fungal Culture [[id number]] Collected: 02/04/24 2333    Order Status: Sent Specimen: Bone from Skull Updated: 02/04/24 " 2342    Mycobacteria and Nocardia Culture [[id number]] Collected: 02/04/24 2333    Order Status: Sent Specimen: Bone from Skull Updated: 02/04/24 2342            Active Diagnoses:    Diagnosis Date Noted POA    PRINCIPAL PROBLEM:  Subdural hematoma [S06.5XAA] 02/03/2024 Yes    Hypocalcemia [E83.51] 02/06/2024 Yes    Dysphagia [R13.10] 02/06/2024 Yes      Problems Resolved During this Admission:       RENE Padron-BC  Neurosurgery  [facility] - [address] [place] [address] ICU

## 2024-02-08 NOTE — PROGRESS NOTES
[facility] PROGRESS NOTE    HD# 5  Admission Summary:   In brief, Rico Briggs is a 68 y.o. male admitted on 2/2/2024 following unwitnessed fall at some point yesterday resulting in a subdural hematoma, posterior head laceration repair with staples at outside hospital.  Upon arrival her transfer patient was alert, but over the course monitoring the patient he had a worsening change in neuro status, neurosurgery was consulted again after repeat CT head showed worsening of subdural hematoma.  Past medical history of this patient has unknown other than a pacemaker in place. .     Interval history:    Awake and extubated doing well. Drain pulled will monitor for any changes to neurostatus    Consults:   Neurosurgery Injuries:  Subdural hematoma    []Problems list reviewed Operations/Procedures:  Craniotomy  craniectomy     Past medical history:  Hypercholesterolemia  Cad  Hypertension  gout    Medications: [] Medications reviewed/updated   Home Meds:    Current Outpatient Medications   Medication Instructions    allopurinoL (ZYLOPRIM) 300 mg, Oral, Daily    aspirin 81 mg, Oral, Daily    atorvastatin (LIPITOR) 10 mg, Oral, Daily    carvediloL (COREG) 3.125 mg, Oral, 2 times daily with meals    furosemide (LASIX) 40 mg, Oral, 2 times daily    ramipriL (ALTACE) 2.5 mg, Oral, Daily    spironolactone (ALDACTONE) 25 mg, Oral, Daily      Scheduled Meds:    allopurinoL  300 mg Oral Daily    carvediloL  3.125 mg Oral BID    docusate sodium  100 mg Oral BID    furosemide  40 mg Oral BID    levETIRAcetam (Keppra) IV (PEDS and ADULTS)  500 mg Intravenous Q12H    mupirocin   Nasal BID    polyethylene glycol  17 g Oral BID    spironolactone  25 mg Oral Daily     Continuous Infusions:    sodium chloride 0.9% Stopped (02/07/24 1252)     PRN Meds: 0.9%  NaCl infusion (for blood administration), acetaminophen, bisacodyL, fentaNYL, hydrALAZINE, labetalol, melatonin, oxyCODONE     Vitals:  VITAL SIGNS: 24 HR MIN & MAX LAST   Temp   Min: 97.3 °F (36.3 °C)  Max: 99.6 °F (37.6 °C)  97.3 °F (36.3 °C)   BP  Min: 114/68  Max: 158/83  132/77    Pulse  Min: 45  Max: 80  62    Resp  Min: 10  Max: 28  17    SpO2  Min: 93 %  Max: 99 %  95 %      HT: 6' (182.9 cm)  WT: 103 kg (227 lb 1.2 oz)  BMI: 30.8   Ideal Body Weight (IBW), Male: 178 lb  % Ideal Body Weight, Male (lb): 127.57 %        General  Exam: intubated and sedated     Neuro/Psych  GCS: 10T (E 3) (V 1) (M 6)  Exam: moves all extremities follows colmmands  ICP monitor: No  ICP treatment: ICP Treatment: N/A  C-Collar: No    Plan:   wnl     HEENT  Exam: perrl    Plan:   Crani done otherwise drain in p[lace     Pulmonary  Vitals: Resp  Av  Min: 10  Max: 28  SpO2  Av.5 %  Min: 93 %  Max: 99 %    Ventilator/Oxygen Settings:   Vent Mode: CPAP / PSV  Vt Set: 500 mL  Set Rate: 24 BPM  Pressure Support: 10 cmH20  I:E Ratio Measured: 1:3.2 Vent Mode: CPAP / PSV (24)  Ventilator Initiated: Yes (24)  Set Rate: 24 BPM (24)  Vt Set: 500 mL (24)  Pressure Support: 10 cmH20 (24)  PEEP/CPAP: 5 cmH20 (24)  Oxygen Concentration (%): 30 (24)  Peak Airway Pressure: 16 cmH20 (24)  Total Ve: 7.9 L/m (24)  F/VT Ratio<105 (RSBI): (!) 17.94 (24)      PaO2/FiO2 ratio (if ventilated):   RSBI RR/TV (if ventilated):      ABG:   Recent Labs   Lab 24   PH 7.510*   PO2 135.0*   PCO2 32.0*   HCO3 25.5          CXR:    No results found in the last 24 hours.      Rib fractures: none  Chest Tube: None     Exam: cta b Peep 8 tv 500     Plan:     On vent   Incentive Spirometry/RT Treatments:      Cardiovascular  Vitals: Pulse  Av.7  Min: 45  Max: 80  BP  Min: 114/68  Max: 158/83  Recent Labs   Lab 24  0033 24  0655 24  0241   TROPONINI 0.552* 0.335*  --    BNP  --   --  266.8*       Vasoactive Agents: None  Exam: rrr    Plan:   wnl     Renal  Recent Labs     24  0241  "24  0033 24  015   BUN 13.6 14.6 10.9   CREATININE 0.72* 0.71* 0.70*         No results for input(s): "LACTIC" in the last 72 hours.    Intake/Output - Last 3 Shifts          0759     I.V. (mL/kg) 1039.5 (10.1) 517.6 (5)     IV Piggyback 362.8 285     Total Intake(mL/kg) 1402.3 (13.6) 802.7 (7.8)     Urine (mL/kg/hr) 2275 (0.9) 2900 (1.2)     Drains 55 70     Stool 0      Total Output 2330 2970     Net -927.8 -2167.3            Stool Occurrence 0 x               Intake/Output Summary (Last 24 hours) at 2024 0721  Last data filed at 2024 0604  Gross per 24 hour   Intake 802.67 ml   Output 2970 ml   Net -2167.33 ml           Bay: No     DAILY Risk Assessment:   DAILY Risk Factors: Congestive heart failure                     2  Hypertension                                      2  Nephrotoxic exposure                        3  Mechanical ventilation                        2  *Minimum score 0; Maximum score 21*  Total score: 9    Plan:   wnl     FEN/GI  Recent Labs     24  024243 24  0156    141 140   K 3.7 3.7 3.4*   CO2 23 23 24   CALCIUM 8.1* 8.3* 8.5*   MG  --  2.40 2.30   PHOS  --  3.2 3.2   ALBUMIN 3.0* 3.0* 3.0*   BILITOT 0.8 1.0 1.0   AST 12 13 14   ALKPHOS 42 44 49   ALT 13 11 14         Diet: NPO    Last BM: unknown    Abdominal Exam: soft nt nd    Plan:   wnl     Heme/Onc  Recent Labs     24  024243 24  0156   HGB 10.3* 10.6* 11.2*   HCT 30.9* 31.8* 32.7*    182 201         Transfusions (over past 24h): None    Plan:   wnl     ID  Temp  Av.4 °F (36.9 °C)  Min: 97.3 °F (36.3 °C)  Max: 99.6 °F (37.6 °C)      Recent Labs     24  02424  0033 24  0156   WBC 10.30 8.94 9.56         Cultures: Antibiotics:     1.      Plan:   wnl     Endocrine  Recent Labs     24   GLUCOSE 92 81* 95        No results for " "input(s): "POCTGLUCOSE" in the last 72 hours.       Plan:   Glucose normal  Insulin treatment: sliding scale as needed     Musculoskeletal/Wounds  Weight bearing status:   RUE: WBAT  LUE: WBAT  RLE: WBAT  LLE: WBAT    [] Tertiary exam performed    Extremity/wound exam:   Plan:   wnl     Precautions  Standard     Prophylaxis  Seizure: Keppra (day 1/7)  DVT: Defer chemoprophylaxis to Neurosurgery   GI: PPI     Lines/drains/airway [] LDA reviewed/updated   Lines/Drains/Airways       Drain  Duration                  Closed/Suction Drain 02/04/24 2315 Right Scalp Accordion 10 Fr. 3 days              Peripheral Intravenous Line  Duration                  Peripheral IV - Single Lumen 02/07/24 2100 18 G Left;Posterior Forearm <1 day                    Plan:  wnl     Restraints  Face to face evaluation of need for restraints on rounds today:   Currently restrained? No.        Disposition  Unchanged. Continue ongoing ICU level care.  Right subdural - to OR emergently completely  Right intraparenchymal hemmorhage - monitor for expansion  Respiratory fialure - trying to extubate but still going apenic         Wilson Chadwick MD     30 minutes of critical care was spent on this patient personally by me on the following activities: development of treatment plan with patient and bedside nurse, discussions with consultants, evaluation of patient's response to treatment, examining the patient, ordering and preforming treatments and interventions, ordering and reviewing laboratory studies, ordering and reviewing radiologic studies, and re-evaluation of patient's condition.      "

## 2024-02-08 NOTE — PROGRESS NOTES
Inpatient Nutrition Evaluation    Admit Date: 2/2/2024   Total duration of encounter: 6 days   Patient Age: 68 y.o.    Nutrition Recommendation/Prescription     Continue soft and bite sized diet per SLP recs  RD to order strawberry Boost [facility] BID to provide 530 kcal and 22 g protein per serving  Continue bowel regimen    Nutrition Assessment     Chart Review    Reason Seen: length of stay    Malnutrition Screening Tool Results   Have you recently lost weight without trying?: No  Have you been eating poorly because of a decreased appetite?: No   MST Score: 0   Diagnosis:  Right SDH  Right intraparenchymal hemorrhage  POD#3 right craniectomy for SDH  POD#4 right craniotomy for SDH    Relevant Medical History: HLD, HTN, CAD, gout    Scheduled Medications:  allopurinoL, 300 mg, Daily  carvediloL, 3.125 mg, BID  docusate sodium, 100 mg, BID  furosemide, 40 mg, BID  levETIRAcetam (Keppra) IV (PEDS and ADULTS), 500 mg, Q12H  mupirocin, , BID  polyethylene glycol, 17 g, BID  spironolactone, 25 mg, Daily    Continuous Infusions:  sodium chloride 0.9%, Last Rate: Stopped (02/07/24 1252)    PRN Medications: 0.9%  NaCl infusion (for blood administration), acetaminophen, bisacodyL, fentaNYL, hydrALAZINE, labetalol, melatonin, oxyCODONE    Recent Labs   Lab 02/03/24  0234 02/03/24  0655 02/04/24  0206 02/04/24 2027 02/05/24  0123 02/06/24  0241 02/07/24  0033 02/08/24  0156    139 139  --  138 141 141 140   K 4.5 4.2 3.6  --  3.8 3.7 3.7 3.4*   CALCIUM 8.6* 8.1* 8.3*  --  8.2* 8.1* 8.3* 8.5*   PHOS  --   --   --   --  2.0*  --  3.2 3.2   MG  --   --   --   --  2.40  --  2.40 2.30   CHLORIDE 109* 107 109*  --  105 110* 109* 108*   CO2 23 22* 22*  --  23 23 23 24   BUN 15.9 14.1 10.1  --  11.7 13.6 14.6 10.9   CREATININE 0.84 0.74 0.65*  --  0.69* 0.72* 0.71* 0.70*   EGFRNORACEVR >60 >60 >60  --  >60 >60 >60 >60   GLUCOSE 112 140* 126*  --  143* 92 81* 95   BILITOT 1.6* 1.4 1.4  --  1.5 0.8 1.0 1.0   ALKPHOS 51 49 41  --  47  Spoke with layla and I will see her at the falls clinic on Monday at 0830 on the 17th.   Will send to reception to place on schedule    42 44 49   ALT 39 35 27  --  20 13 11 14   AST 35* 29 23  --  17 12 13 14   ALBUMIN 3.7 3.5 3.4  --  3.2* 3.0* 3.0* 3.0*   PREALB  --   --   --   --  19.4  --   --  16.9   CRP  --   --   --   --  132.20*  --   --  42.20*   WBC  --  11.16 16.38* 13.49* 11.96* 10.30 8.94 9.56   HGB  --  13.9* 12.7* 12.2* 10.8* 10.3* 10.6* 11.2*   HCT  --  40.2* 38.1* 36.9* 31.8* 30.9* 31.8* 32.7*     Nutrition Orders:  Diet Soft & Bite Sized (IDDSI Level 6) No Straws  Dietary nutrition supplements Boost Very High Calorie Nutritional Drink - Strawberry; BID    Appetite/Oral Intake: fair/50-75% of meals  Factors Affecting Nutritional Intake: difficulty/impaired swallowing  Food/Shinto/Cultural Preferences: none reported  Food Allergies: no known food allergies  Last Bowel Movement: 02/02/24  Wound(s):      Comments    2/8: Pt reports fair appetite, does not like the diet. Agreed to ONS, requesting strawberry flavor. Will order [facility] BID. Good appetite prior admission. Denies GI complaints, last BM 2/2 noted. Denies unintentional jesenia loss.     Anthropometrics    Height: 6' (182.9 cm), Height Method: Estimated  Last Weight: 103 kg (227 lb 1.2 oz) (02/03/24 1620), Weight Method: Bed Scale  BMI (Calculated): 30.8  BMI Classification: obese grade I (BMI 30-34.9)        Ideal Body Weight (IBW), Male: 178 lb     % Ideal Body Weight, Male (lb): 127.57 %                          Usual Weight Provided By: patient denies unintentional weight loss    Wt Readings from Last 5 Encounters:   02/03/24 103 kg (227 lb 1.2 oz)     Weight Change(s) Since Admission:   Wt Readings from Last 1 Encounters:   02/03/24 1620 103 kg (227 lb 1.2 oz)   02/03/24 0049 103 kg (227 lb 1.2 oz)   Admit Weight: 103 kg (227 lb 1.2 oz) (02/03/24 0049), Weight Method: Bed Scale    Patient Education     Not applicable.    Nutrition Goals & Monitoring     Dietitian will monitor: food and beverage intake, weight, electrolyte/renal panel, and gastrointestinal  profile    Nutrition Risk/Follow-Up: low (follow-up in 5-7 days)  Patients assigned 'low nutrition risk' status do not qualify for a full nutritional assessment but will be monitored and re-evaluated in a 5-7 day time period. Please consult if re-evaluation needed sooner.

## 2024-02-08 NOTE — PLAN OF CARE
Amairani BRITO received referral and has the floor number for additional info if needed.   Pt has been up walking with therapy

## 2024-02-08 NOTE — PLAN OF CARE
Pt will dc to [facility] tomorrow, ambulance will  10;30  He will go to [facility]   [address] [facility] [address]  Room 326 B  Dr Laguna accepting dr Pak MAR and discharge info to be faxed to  att; Neeta  Put info into packet   Disc ordered  Report to be called to

## 2024-02-08 NOTE — PLAN OF CARE
Plan if for dc in the am to [facility]  Will send tomorrow update to Polly at [facility]  Their MOT will call with acceptance, room , who to give report to.   Packet started and disc ordered.

## 2024-02-09 VITALS
WEIGHT: 227.06 LBS | DIASTOLIC BLOOD PRESSURE: 79 MMHG | TEMPERATURE: 98 F | HEIGHT: 72 IN | OXYGEN SATURATION: 99 % | BODY MASS INDEX: 30.75 KG/M2 | RESPIRATION RATE: 19 BRPM | HEART RATE: 76 BPM | SYSTOLIC BLOOD PRESSURE: 122 MMHG

## 2024-02-09 PROBLEM — E83.51 HYPOCALCEMIA: Status: RESOLVED | Noted: 2024-02-06 | Resolved: 2024-02-09

## 2024-02-09 PROBLEM — R13.10 DYSPHAGIA: Status: RESOLVED | Noted: 2024-02-06 | Resolved: 2024-02-09

## 2024-02-09 LAB
ALBUMIN SERPL-MCNC: 3.2 G/DL (ref 3.4–4.8)
ALBUMIN/GLOB SERPL: 0.9 RATIO (ref 1.1–2)
ALP SERPL-CCNC: 57 UNIT/L (ref 40–150)
ALT SERPL-CCNC: 19 UNIT/L (ref 0–55)
AST SERPL-CCNC: 17 UNIT/L (ref 5–34)
BASOPHILS # BLD AUTO: 0.09 X10(3)/MCL
BASOPHILS NFR BLD AUTO: 0.8 %
BILIRUB SERPL-MCNC: 0.9 MG/DL
BUN SERPL-MCNC: 11.7 MG/DL (ref 8.4–25.7)
CALCIUM SERPL-MCNC: 9 MG/DL (ref 8.8–10)
CHLORIDE SERPL-SCNC: 107 MMOL/L (ref 98–107)
CO2 SERPL-SCNC: 24 MMOL/L (ref 23–31)
CREAT SERPL-MCNC: 0.71 MG/DL (ref 0.73–1.18)
EOSINOPHIL # BLD AUTO: 0.66 X10(3)/MCL (ref 0–0.9)
EOSINOPHIL NFR BLD AUTO: 6 %
ERYTHROCYTE [DISTWIDTH] IN BLOOD BY AUTOMATED COUNT: 13.6 % (ref 11.5–17)
GFR SERPLBLD CREATININE-BSD FMLA CKD-EPI: >60 MLS/MIN/1.73/M2
GLOBULIN SER-MCNC: 3.4 GM/DL (ref 2.4–3.5)
GLUCOSE SERPL-MCNC: 97 MG/DL (ref 82–115)
HCT VFR BLD AUTO: 36.9 % (ref 42–52)
HGB BLD-MCNC: 12.3 G/DL (ref 14–18)
IMM GRANULOCYTES # BLD AUTO: 0.18 X10(3)/MCL (ref 0–0.04)
IMM GRANULOCYTES NFR BLD AUTO: 1.6 %
LYMPHOCYTES # BLD AUTO: 1.77 X10(3)/MCL (ref 0.6–4.6)
LYMPHOCYTES NFR BLD AUTO: 16.1 %
MAGNESIUM SERPL-MCNC: 2.4 MG/DL (ref 1.6–2.6)
MCH RBC QN AUTO: 30.8 PG (ref 27–31)
MCHC RBC AUTO-ENTMCNC: 33.3 G/DL (ref 33–36)
MCV RBC AUTO: 92.3 FL (ref 80–94)
MONOCYTES # BLD AUTO: 1.04 X10(3)/MCL (ref 0.1–1.3)
MONOCYTES NFR BLD AUTO: 9.5 %
NEUTROPHILS # BLD AUTO: 7.25 X10(3)/MCL (ref 2.1–9.2)
NEUTROPHILS NFR BLD AUTO: 66 %
NRBC BLD AUTO-RTO: 0 %
PHOSPHATE SERPL-MCNC: 3.9 MG/DL (ref 2.3–4.7)
PLATELET # BLD AUTO: 236 X10(3)/MCL (ref 130–400)
PMV BLD AUTO: 11.8 FL (ref 7.4–10.4)
POTASSIUM SERPL-SCNC: 3.7 MMOL/L (ref 3.5–5.1)
PROT SERPL-MCNC: 6.6 GM/DL (ref 5.8–7.6)
RBC # BLD AUTO: 4 X10(6)/MCL (ref 4.7–6.1)
SODIUM SERPL-SCNC: 140 MMOL/L (ref 136–145)
WBC # SPEC AUTO: 10.99 X10(3)/MCL (ref 4.5–11.5)

## 2024-02-09 PROCEDURE — 25000003 PHARM REV CODE 250

## 2024-02-09 PROCEDURE — 25000003 PHARM REV CODE 250: Performed by: SURGERY

## 2024-02-09 PROCEDURE — 63600175 PHARM REV CODE 636 W HCPCS

## 2024-02-09 PROCEDURE — 85025 COMPLETE CBC W/AUTO DIFF WBC: CPT

## 2024-02-09 PROCEDURE — 80053 COMPREHEN METABOLIC PANEL: CPT

## 2024-02-09 PROCEDURE — 25000003 PHARM REV CODE 250: Performed by: NURSE PRACTITIONER

## 2024-02-09 PROCEDURE — 83735 ASSAY OF MAGNESIUM: CPT | Performed by: NURSE PRACTITIONER

## 2024-02-09 PROCEDURE — 84100 ASSAY OF PHOSPHORUS: CPT | Performed by: NURSE PRACTITIONER

## 2024-02-09 RX ADMIN — FUROSEMIDE 40 MG: 40 TABLET ORAL at 08:02

## 2024-02-09 RX ADMIN — ACETAMINOPHEN 650 MG: 325 TABLET, FILM COATED ORAL at 08:02

## 2024-02-09 RX ADMIN — CARVEDILOL 3.12 MG: 3.12 TABLET, FILM COATED ORAL at 08:02

## 2024-02-09 RX ADMIN — POLYETHYLENE GLYCOL 3350 17 G: 17 POWDER, FOR SOLUTION ORAL at 08:02

## 2024-02-09 RX ADMIN — ALLOPURINOL 300 MG: 100 TABLET ORAL at 08:02

## 2024-02-09 RX ADMIN — ACETAMINOPHEN 650 MG: 650 SOLUTION ORAL at 04:02

## 2024-02-09 RX ADMIN — SPIRONOLACTONE 25 MG: 25 TABLET ORAL at 08:02

## 2024-02-09 RX ADMIN — LEVETIRACETAM 500 MG: 100 INJECTION, SOLUTION INTRAVENOUS at 08:02

## 2024-02-09 RX ADMIN — DOCUSATE SODIUM 100 MG: 100 CAPSULE, LIQUID FILLED ORAL at 08:02

## 2024-02-09 NOTE — DISCHARGE SUMMARY
[facility] ICU  TICU  Discharge Summary      Patient Name: Rico Briggs  MRN: [id number]  Admission Date: 2/2/2024  Hospital Length of Stay: 6 days  Discharge Date and Time:  02/09/2024 4:22 AM  Attending Physician: Lg Mccray MD   Discharging Provider: SUSAN Delgado  Primary Care Provider: Jeanne, Primary Doctor    HPI:   No notes on file    Procedure(s) (LRB):  CRANIECTOMY (Right)      Indwelling Lines/Drains at time of discharge:   Lines/Drains/Airways       None                 Hospital Course: 68M with unwitnessed fall. Had a right subdural and underwent craniotomy and take back for craniectomy. He has been accepted to neuro rehab.     Goals of Care Treatment Preferences:  Code Status: Full Code      Consults:   Consults (From admission, onward)          Status Ordering Provider     Inpatient consult to Social Work/Case Management  Once        Provider:  (Not yet assigned)    Acknowledged LG MCCRAY     Inpatient consult to Orthotics  Once        Provider:  Lauri Farrell MD    Acknowledged LG MCCRAY     Inpatient consult to Neurosurgery  Once        Provider:  Lauri Farrell MD    Completed AMINA ZHOU            Significant Diagnostic Studies: N/A    Pending Diagnostic Studies:       None          Final Active Diagnoses:    Diagnosis Date Noted POA    PRINCIPAL PROBLEM:  Subdural hematoma [S06.5XAA] 02/03/2024 Yes    Hypocalcemia [E83.51] 02/06/2024 Yes    Dysphagia [R13.10] 02/06/2024 Yes      Problems Resolved During this Admission:      Discharged Condition: good    Disposition: Rehab Facility    Follow Up:    Patient Instructions:   No discharge procedures on file.  Medications:  Reconciled Home Medications:      Medication List        CONTINUE taking these medications      allopurinoL 300 MG tablet  Commonly known as: ZYLOPRIM  Take 300 mg by mouth once daily.     aspirin 81 MG Chew  Take 81 mg by mouth once daily.      atorvastatin 10 MG tablet  Commonly known as: LIPITOR  Take 10 mg by mouth once daily.     carvediloL 3.125 MG tablet  Commonly known as: COREG  Take 3.125 mg by mouth 2 (two) times daily with meals.     furosemide 40 MG tablet  Commonly known as: LASIX  Take 40 mg by mouth 2 (two) times daily.     ramipriL 2.5 MG capsule  Commonly known as: ALTACE  Take 2.5 mg by mouth once daily.     spironolactone 25 MG tablet  Commonly known as: ALDACTONE  Take 25 mg by mouth once daily.            Time spent on the discharge of patient: 15 minutes    SUSAN Delgado  [facility] - [address] [place] [address] ICU      Addendum    Pt has shown impulsive to get out of bed without help. He is at risk as he is not able to stay on his feet safely.  Due to impulsivity and lack of bone flap he is unsafe to transport without medical to redirect and risk for fall and or behavior who will have detrimental to his safety    Wilson Siddiqui

## 2024-02-09 NOTE — HOSPITAL COURSE
68M with unwitnessed fall. Had a right subdural and underwent craniotomy and take back for craniectomy. He has been accepted to neuro rehab.

## 2024-02-09 NOTE — PROGRESS NOTES
[facility] ICU PROGRESS NOTE    HD# 6  Admission Summary:   In brief, Rico Briggs is a 68 y.o. male admitted on 2/2/2024 following unwitnessed fall at some point yesterday resulting in a subdural hematoma, posterior head laceration repair with staples at outside hospital.  Upon arrival her transfer patient was alert, but over the course monitoring the patient he had a worsening change in neuro status, neurosurgery was consulted again after repeat CT head showed worsening of subdural hematoma.  Past medical history of this patient has unknown other than a pacemaker in place. .     Interval history:    Drain pulled and going to monitor for 24 hrs as last time bleed increased otherwise doing well worked [facility] therapy    Consults:   Neurosurgery Injuries:  Subdural hematoma    []Problems list reviewed Operations/Procedures:  Craniotomy  craniectomy     Past medical history:  Hypercholesterolemia  Cad  Hypertension  gout    Medications: [] Medications reviewed/updated   Home Meds:    Current Outpatient Medications   Medication Instructions    allopurinoL (ZYLOPRIM) 300 mg, Oral, Daily    aspirin 81 mg, Oral, Daily    atorvastatin (LIPITOR) 10 mg, Oral, Daily    carvediloL (COREG) 3.125 mg, Oral, 2 times daily with meals    furosemide (LASIX) 40 mg, Oral, 2 times daily    ramipriL (ALTACE) 2.5 mg, Oral, Daily    spironolactone (ALDACTONE) 25 mg, Oral, Daily      Scheduled Meds:    allopurinoL  300 mg Oral Daily    carvediloL  3.125 mg Oral BID    docusate sodium  100 mg Oral BID    furosemide  40 mg Oral BID    levETIRAcetam (Keppra) IV (PEDS and ADULTS)  500 mg Intravenous Q12H    polyethylene glycol  17 g Oral BID    spironolactone  25 mg Oral Daily     Continuous Infusions:    sodium chloride 0.9% Stopped (02/07/24 1252)     PRN Meds: 0.9%  NaCl infusion (for blood administration), acetaminophen, acetaminophen, bisacodyL, fentaNYL, hydrALAZINE, labetalol, melatonin, oxyCODONE     Vitals:  VITAL SIGNS: 24 HR MIN & MAX  LAST   Temp  Min: 97.6 °F (36.4 °C)  Max: 98.7 °F (37.1 °C)  98.7 °F (37.1 °C)   BP  Min: 104/67  Max: 136/73  120/69    Pulse  Min: 58  Max: 77  (!) 58    Resp  Min: 14  Max: 25  15    SpO2  Min: 93 %  Max: 98 %  95 %      HT: 6' (182.9 cm)  WT: 103 kg (227 lb 1.2 oz)  BMI: 30.8   Ideal Body Weight (IBW), Male: 178 lb  % Ideal Body Weight, Male (lb): 127.57 %        General  Exam: intubated and sedated     Neuro/Psych  GCS: 10T (E 3) (V 1) (M 6)  Exam: moves all extremities follows colmmands  ICP monitor: No  ICP treatment: ICP Treatment: N/A  C-Collar: No    Plan:   wnl     HEENT  Exam: perrl    Plan:   Crani done otherwise drain in p[lace     Pulmonary  Vitals: Resp  Av.5  Min: 14  Max: 25  SpO2  Av %  Min: 93 %  Max: 98 %    Ventilator/Oxygen Settings:   Vent Mode: CPAP / PSV  Vt Set: 500 mL  Set Rate: 24 BPM  Pressure Support: 10 cmH20  I:E Ratio Measured: 1:3.2 Vent Mode: CPAP / PSV (24)  Ventilator Initiated: Yes (24)  Set Rate: 24 BPM (24)  Vt Set: 500 mL (24)  Pressure Support: 10 cmH20 (24)  PEEP/CPAP: 5 cmH20 (24)  Oxygen Concentration (%): 30 (24)  Peak Airway Pressure: 16 cmH20 (24)  Total Ve: 7.9 L/m (24)  F/VT Ratio<105 (RSBI): (!) 17.94 (24)      PaO2/FiO2 ratio (if ventilated):   RSBI RR/TV (if ventilated):      ABG:   Recent Labs   Lab 24   PH 7.510*   PO2 135.0*   PCO2 32.0*   HCO3 25.5          CXR:    No results found in the last 24 hours.      Rib fractures: none  Chest Tube: None     Exam: cta b Peep 8 tv 500     Plan:     On vent   Incentive Spirometry/RT Treatments:      Cardiovascular  Vitals: Pulse  Av.5  Min: 58  Max: 77  BP  Min: 104/67  Max: 136/73  Recent Labs   Lab 24  0033 24  0655 24  0241   TROPONINI 0.552* 0.335*  --    BNP  --   --  266.8*       Vasoactive Agents: None  Exam: rrr    Plan:   wnl     Renal  Recent Labs      "24  0033 02/08/24  0156 24  014   BUN 14.6 10.9 11.7   CREATININE 0.71* 0.70* 0.71*         No results for input(s): "LACTIC" in the last 72 hours.    Intake/Output - Last 3 Shifts          0659  0659 02/09 0700  02/10 0659    I.V. (mL/kg) 517.6 (5)      IV Piggyback 285 97.6     Total Intake(mL/kg) 802.7 (7.8) 97.6 (0.9)     Urine (mL/kg/hr) 2900 (1.2) 2650 (1.1)     Drains 70 100     Stool       Total Output 2970 2750     Net -2167.3 -2652.4                     Intake/Output Summary (Last 24 hours) at 2024 0747  Last data filed at 2024 0627  Gross per 24 hour   Intake 97.63 ml   Output 2300 ml   Net -2202.37 ml           Bay: No     DAILY Risk Assessment:   DAILY Risk Factors: Congestive heart failure                     2  Hypertension                                      2  Nephrotoxic exposure                        3  Mechanical ventilation                        2  *Minimum score 0; Maximum score 21*  Total score: 9    Plan:   wnl     FEN/GI  Recent Labs     24    140 140   K 3.7 3.4* 3.7   CO2 23 24 24   CALCIUM 8.3* 8.5* 9.0   MG 2.40 2.30 2.40   PHOS 3.2 3.2 3.9   ALBUMIN 3.0* 3.0* 3.2*   BILITOT 1.0 1.0 0.9   AST 13 14 17   ALKPHOS 44 49 57   ALT 11 14 19         Diet: NPO    Last BM: unknown    Abdominal Exam: soft nt nd    Plan:   wnl     Heme/Onc  Recent Labs     24   HGB 10.6* 11.2* 12.3*   HCT 31.8* 32.7* 36.9*    201 236         Transfusions (over past 24h): None    Plan:   wnl     ID  Temp  Av.1 °F (36.7 °C)  Min: 97.6 °F (36.4 °C)  Max: 98.7 °F (37.1 °C)      Recent Labs     24  0033 24  0156 24  014   WBC 8.94 9.56 10.99         Cultures: Antibiotics:     1.      Plan:   wnl     Endocrine  Recent Labs     246 24   GLUCOSE 81* 95 97        No results for input(s): "POCTGLUCOSE" in the last " 72 hours.       Plan:   Glucose normal  Insulin treatment: sliding scale as needed     Musculoskeletal/Wounds  Weight bearing status:   RUE: WBAT  LUE: WBAT  RLE: WBAT  LLE: WBAT    [] Tertiary exam performed    Extremity/wound exam:   Plan:   wnl     Precautions  Standard     Prophylaxis  Seizure: Keppra (day 1/7)  DVT: Defer chemoprophylaxis to Neurosurgery   GI: PPI     Lines/drains/airway [] LDA reviewed/updated   Lines/Drains/Airways       Peripheral Intravenous Line  Duration                  Peripheral IV - Single Lumen 02/07/24 2100 18 G Left;Posterior Forearm 1 day                    Plan:  wnl     Restraints  Face to face evaluation of need for restraints on rounds today:   Currently restrained? No.        Disposition  Unchanged. Continue ongoing ICU level care.  Right subdural - to OR emergently completely  Right intraparenchymal hemmorhage - monitor for expansion  Respiratory fialure - trying to extubate but still going apenic         Wilson Chadwick MD     30 minutes of critical care was spent on this patient personally by me on the following activities: development of treatment plan with patient and bedside nurse, discussions with consultants, evaluation of patient's response to treatment, examining the patient, ordering and preforming treatments and interventions, ordering and reviewing laboratory studies, ordering and reviewing radiologic studies, and re-evaluation of patient's condition.

## 2024-02-09 NOTE — PLAN OF CARE
[facility] Spoke with Patricia with [facility] with  regarding transport.   Nursing confirms they have sent info and are calling report.   Disc is in packet

## 2024-02-10 LAB — BACTERIA TISS AEROBE CULT: NO GROWTH

## 2024-02-26 ENCOUNTER — TELEPHONE (OUTPATIENT)
Dept: NEUROSURGERY | Facility: CLINIC | Age: 69
End: 2024-02-26
Payer: MEDICARE

## 2024-02-26 DIAGNOSIS — I62.00 NONTRAUMATIC SUBDURAL HEMORRHAGE, UNSPECIFIED: Primary | ICD-10-CM

## 2024-02-26 NOTE — TELEPHONE ENCOUNTER
I spoke with patient spouse to see if patient has been d/c from rehab. The patient was d/c from rehab but went back to hospital following d/c. I asked the spouse about when he would be d/c from hospital she stated she didn't know. I told her I would follow up in a week to see if patient is d/c. The spouse verbalized understanding.

## 2024-03-04 LAB — FUNGUS SPEC CULT: NORMAL

## 2024-03-07 ENCOUNTER — TELEPHONE (OUTPATIENT)
Dept: NEUROSURGERY | Facility: CLINIC | Age: 69
End: 2024-03-07
Payer: MEDICARE

## 2024-03-19 LAB — MYCOBACTERIUM SPEC CULT: NORMAL

## 2024-04-04 ENCOUNTER — TELEPHONE (OUTPATIENT)
Dept: NEUROSURGERY | Facility: CLINIC | Age: 69
End: 2024-04-04
Payer: MEDICARE

## 2024-04-04 NOTE — TELEPHONE ENCOUNTER
I called pt to if he was d/c to schedule hospital f/u with CT head no answer lvm. I also called spouse and daughter and lvm. I will send out letter to patient due to being unable to reach pt .

## (undated) DEVICE — BANDAGE GAUZE COT STRL 4.5X4.1

## (undated) DEVICE — Device

## (undated) DEVICE — POSITIONER HEAD ADULT

## (undated) DEVICE — TUBING IRR BIPOLAR CORD 12FT

## (undated) DEVICE — GLOVE PROTEXIS BLUE LATEX 8

## (undated) DEVICE — SPONGE GAUZE 16PLY 4X4

## (undated) DEVICE — POSITIONER HEEL FOAM CONVOLTD

## (undated) DEVICE — SUT VICRYL 2-0 8-18 CP-2

## (undated) DEVICE — STAPLER SKIN PROXIMATE WIDE

## (undated) DEVICE — GLOVE PROTEXIS PI SYN SURG 7.5

## (undated) DEVICE — APPLICATOR CHLORAPREP ORN 26ML

## (undated) DEVICE — PERFORATOR SURG CRAN 14X11MM

## (undated) DEVICE — SOL .9NACL PF 100 ML

## (undated) DEVICE — GAUZE SPONGE 4X4 12PLY

## (undated) DEVICE — ELECTRODE PATIENT RETURN DISP

## (undated) DEVICE — SPONGE LAP 18X18 PREWASHED

## (undated) DEVICE — HEMOSTAT SURGICEL 2X14IN

## (undated) DEVICE — TUBE SUCTION MEDI-VAC STERILE

## (undated) DEVICE — SOL NACL IRR 1000ML BTL

## (undated) DEVICE — SUT 4/0 18IN NUROLON BLK B

## (undated) DEVICE — ROUTER TAPERED 2.3MM

## (undated) DEVICE — CLIPS RANEY SCALP FFS/ASSY

## (undated) DEVICE — GOWN X-LG STERILE BACK

## (undated) DEVICE — NDL HYPO 22GX1 1/2 SYR 10ML LL

## (undated) DEVICE — PACK VARISPREED BATTERY

## (undated) DEVICE — TRAY CATH FOL SIL URIMTR 16FR

## (undated) DEVICE — HOOK LONE STAR RETRCT 12MM

## (undated) DEVICE — HEMOSTAT SURGICEL FIBRLR 2X4IN

## (undated) DEVICE — SEE MEDLINE ITEM 157103

## (undated) DEVICE — GAUZE SPONGE BULKEE 6X6.75IN

## (undated) DEVICE — BANDAGE BULKEE LITE 3INX4.1YD

## (undated) DEVICE — ROUTER STRAIGHT RED 1.1MM

## (undated) DEVICE — KIT SURGIFLO HEMOSTATIC MATRIX

## (undated) DEVICE — GAUZE XEROFORM NONADH 5X9IN

## (undated) DEVICE — COVER HD BACK TABLE 6FT

## (undated) DEVICE — DISH PETRI MED 3.5IN

## (undated) DEVICE — SPONGE SURGIFOAM 100 8.5X12X10

## (undated) DEVICE — MARKER WRITESITE SKIN CHLRAPRP

## (undated) DEVICE — KIT SURGICAL TURNOVER

## (undated) DEVICE — FORCEP SPTZLR-MALIS 1.5MM 8IN

## (undated) DEVICE — KIT DRAIN WOUND RND SPRNG RESV

## (undated) DEVICE — DRESSING TELFA N ADH 3X8

## (undated) DEVICE — ELECTRODE BLADE INSULATED 1 IN